# Patient Record
Sex: FEMALE | Race: BLACK OR AFRICAN AMERICAN | Employment: UNEMPLOYED | URBAN - METROPOLITAN AREA
[De-identification: names, ages, dates, MRNs, and addresses within clinical notes are randomized per-mention and may not be internally consistent; named-entity substitution may affect disease eponyms.]

---

## 2017-02-15 ENCOUNTER — OFFICE VISIT (OUTPATIENT)
Dept: FAMILY MEDICINE CLINIC | Age: 37
End: 2017-02-15

## 2017-02-15 ENCOUNTER — TELEPHONE (OUTPATIENT)
Dept: FAMILY MEDICINE CLINIC | Age: 37
End: 2017-02-15

## 2017-02-15 VITALS
SYSTOLIC BLOOD PRESSURE: 135 MMHG | TEMPERATURE: 97.7 F | RESPIRATION RATE: 12 BRPM | BODY MASS INDEX: 45.64 KG/M2 | HEIGHT: 62 IN | DIASTOLIC BLOOD PRESSURE: 81 MMHG | WEIGHT: 248 LBS | OXYGEN SATURATION: 99 % | HEART RATE: 73 BPM

## 2017-02-15 DIAGNOSIS — K08.89 PAIN, DENTAL: Primary | ICD-10-CM

## 2017-02-15 RX ORDER — AMOXICILLIN AND CLAVULANATE POTASSIUM 875; 125 MG/1; MG/1
1 TABLET, FILM COATED ORAL EVERY 12 HOURS
Qty: 20 TAB | Refills: 0 | Status: SHIPPED | OUTPATIENT
Start: 2017-02-15 | End: 2017-02-25

## 2017-02-15 RX ORDER — CHOLECALCIFEROL (VITAMIN D3) 125 MCG
CAPSULE ORAL
COMMUNITY
End: 2017-04-03

## 2017-02-15 RX ORDER — LIDOCAINE HYDROCHLORIDE 20 MG/ML
SOLUTION OROPHARYNGEAL
Qty: 100 ML | Refills: 0 | Status: SHIPPED | OUTPATIENT
Start: 2017-02-15 | End: 2017-04-03

## 2017-02-15 NOTE — MR AVS SNAPSHOT
Visit Information Date & Time Provider Department Dept. Phone Encounter #  
 2/15/2017  1:40 PM Angi Rogers MD [address] [facility] [phone] [id number] Upcoming Health Maintenance Date Due DTaP/Tdap/Td series (1 - Tdap) 10/7/2001 PAP AKA CERVICAL CYTOLOGY 10/7/2001 INFLUENZA AGE 9 TO ADULT 8/1/2016 Allergies as of 2/15/2017  Review Complete On: 2/15/2017 By: Raf Miller No Known Allergies Current Immunizations  Reviewed on 11/20/2014 Name Date Influenza Vaccine Jake Jerez) 11/20/2014 Not reviewed this visit You Were Diagnosed With   
  
 Codes Comments Pain, dental    -  Primary ICD-10-CM: E17.17 ICD-9-CM: 525.9 Vitals BP Pulse Temp Resp Height(growth percentile) Weight(growth percentile) 135/81 (BP 1 Location: Right arm, BP Patient Position: Sitting) 73 97.7 °F (36.5 °C) 12 5' 2\" (1.575 m) 248 lb (112.5 kg) LMP SpO2 BMI OB Status Smoking Status 01/15/2017 99% 45.36 kg/m2 Having regular periods Never Smoker BMI and BSA Data Body Mass Index Body Surface Area  
 45.36 kg/m 2 2.22 m 2 Preferred Pharmacy Pharmacy Name Phone [facility], [address] [phone] Your Updated Medication List  
  
   
This list is accurate as of: 2/15/17  2:23 PM.  Always use your most recent med list.  
  
  
  
  
 ALEVE 220 mg Cap Generic drug:  naproxen sodium Take  by mouth. amoxicillin-clavulanate 875-125 mg per tablet Commonly known as:  AUGMENTIN Take 1 Tab by mouth every twelve (12) hours for 10 days. lidocaine 2 % solution Commonly known as:  XYLOCAINE Coat 10 cotton balls with 30 mL for tooth pain Prescriptions Sent to Pharmacy Refills  
 lidocaine (XYLOCAINE) 2 % solution 0  Sig: Coat 10 cotton balls with 30 mL for tooth pain  
 Class: Normal  
 Pharmacy: [facility], [address] [facility] Ph #: [phone]  
 amoxicillin-clavulanate (AUGMENTIN) 875-125 mg per tablet 0 Sig: Take 1 Tab by mouth every twelve (12) hours for 10 days. Class: Normal  
 Pharmacy: [facility] [address] [facility] Ph #: [phone] Route: Oral  
  
Patient Instructions Tooth and Gum Pain: Care Instructions Your Care Instructions The most common causes of dental pain are tooth decay and gum disease. Pain can also be caused by an infection of the tooth (abscess) or the gums. Or you may have pain from a broken or cracked tooth. Other causes of pain include infection and damage to a tooth from nervous grinding of your teeth. A wisdom tooth can be painful when it is coming in but cannot break through the gum. It can also be painful when the tooth is only partway in and extra gum tissue has formed around it. The tissue can get inflamed (pericoronitis), and sometimes it gets infected. Prompt dental care can help find the cause of your toothache and keep the tooth from dying or gum disease from getting worse. Self-care at home may reduce your pain and discomfort. Follow-up care is a key part of your treatment and safety. Be sure to make and go to all appointments, and call your dentist or doctor if you are having problems. It's also a good idea to know your test results and keep a list of the medicines you take. How can you care for yourself at home? · To reduce pain and facial swelling, put an ice or cold pack on the outside of your cheek for 10 to 20 minutes at a time. Put a thin cloth between the ice and your skin. Do not use heat. · If your doctor prescribed antibiotics, take them as directed. Do not stop taking them just because you feel better. You need to take the full course of antibiotics.  
· Ask your doctor if you can take an over-the-counter pain medicine, such as acetaminophen (Tylenol), ibuprofen (Advil, Motrin), or naproxen (Aleve). Be safe with medicines. Read and follow all instructions on the label. · Avoid very hot, cold, or sweet foods and drinks if they increase your pain. · Rinse your mouth with warm salt water every 2 hours to help relieve pain and swelling. Mix 1 teaspoon of salt in 8 ounces of water. · Talk to your dentist about using special toothpaste for sensitive teeth. To reduce pain on contact with heat or cold or when brushing, brush with this toothpaste regularly or rub a small amount of the paste on the sensitive area with a clean finger 2 or 3 times a day. Floss gently between your teeth. · Do not smoke or use spit tobacco. Tobacco use can make gum problems worse, decreases your ability to fight infection in your gums, and delays healing. If you need help quitting, talk to your doctor about stop-smoking programs and medicines. These can increase your chances of quitting for good. When should you call for help? Call your dentist or doctor now or seek immediate medical care if: 
· You have signs of infection, such as: 
¨ Increased pain, swelling, warmth, or redness. ¨ Red streaks on the gum leading from a tooth. ¨ Pus draining from the gum around a tooth. ¨ A fever. Watch closely for changes in your health, and be sure to contact your doctor if: 
· You do not get better as expected. Where can you learn more? Go to http://alejandra-magali.info/. Enter 0363 [id number] in the search box to learn more about \"Tooth and Gum Pain: Care Instructions. \" Current as of: August 9, 2016 Content Version: 11.1 © 5587-8482 [employer]. Care instructions adapted under license by [employer]xIQ (which disclaims liability or warranty for this information). If you have questions about a medical condition or this instruction, always ask your healthcare professional. [place] [address] any warranty or liability for your use of this information. Introducing [facility] & HEALTH SERVICES! Mica Mann introduces LeadPages patient portal. Now you can access parts of your medical record, email your doctor's office, and request medication refills online. 1. In your internet browser, go to [email or web address] 2. Click on the First Time User? Click Here link in the Sign In box. You will see the New Member Sign Up page. 3. Enter your LeadPages Access Code exactly as it appears below. You will not need to use this code after youve completed the sign-up process. If you do not sign up before the expiration date, you must request a new code. · LeadPages Access Code: [email or web address] Expires: 5/16/2017  2:23 PM 
 
4. Enter the last four digits of your Social Security Number (xxxx) and Date of Birth (mm/dd/yyyy) as indicated and click Submit. You will be taken to the next sign-up page. 5. Create a LeadPages ID. This will be your LeadPages login ID and cannot be changed, so think of one that is secure and easy to remember. 6. Create a LeadPages password. You can change your password at any time. 7. Enter your Password Reset Question and Answer. This can be used at a later time if you forget your password. 8. Enter your e-mail address. You will receive e-mail notification when new information is available in [address] 9. Click Sign Up. You can now view and download portions of your medical record. 10. Click the Download Summary menu link to download a portable copy of your medical information. If you have questions, please visit the Frequently Asked Questions section of the LeadPages website. Remember, LeadPages is NOT to be used for urgent needs. For medical emergencies, dial 911. Now available from your iPhone and Android! Please provide this summary of care documentation to your next provider. Your primary care clinician is listed as Bear Jung. If you have any questions after today's visit, please call [phone].

## 2017-02-15 NOTE — PROGRESS NOTES
HPI  Richi Fink is a 39 y.o. female who is with tooth pain. Started yesterday and got worse today. Can get in to see a dentist until next week. It is her lower left anterior molar. Has never had a problem with this tooth before. Had a similar problem with her upper right canine a few years ago. Has not tried anything for the symptoms    PMHx:  Past Medical History   Diagnosis Date    Thyroid disease        Meds:   Current Outpatient Prescriptions   Medication Sig Dispense Refill    naproxen sodium (ALEVE) 220 mg cap Take  by mouth.  lidocaine (XYLOCAINE) 2 % solution Coat 10 cotton balls with 30 mL for tooth pain 100 mL 0    amoxicillin-clavulanate (AUGMENTIN) 875-125 mg per tablet Take 1 Tab by mouth every twelve (12) hours for 10 days. 20 Tab 0       Allergies:   No Known Allergies    Smoker:  History   Smoking Status    Never Smoker   Smokeless Tobacco    Never Used       ETOH:   History   Alcohol Use    Yes     Comment: social       FH:   Family History   Problem Relation Age of Onset    Hypertension Mother     Cancer Mother        ROS:  As listed in HPI. In addition:  Constitutional:   No headache, fever, fatigue, weight loss or weight gain      Cardiac:    No chest pain      Resp:   No cough or shortness of breath      Neuro   No loss of consciousness, dizziness, seizures      Physical Exam:  Blood pressure 135/81, pulse 73, temperature 97.7 °F (36.5 °C), resp. rate 12, height 5' 2\" (1.575 m), weight 248 lb (112.5 kg), last menstrual period 01/15/2017, SpO2 99 %. GEN: No apparent distress. Alert and oriented and responds to all questions appropriately. OROPHYARYNX: Somewhat poor dentition. General erosion of some of her teeth. The affected tooth which is the left lower anterior molar appears normal to the naked eye. There is no swelling of the gum, inflammation or apparent abscess. The gum is tender to palpation in the tooth itself is tender with pressure.   Pain generator appears to be the gum       Assessment and Plan     Dental pain, no apparent abscess. Antibiotics as a matter of course for what could be a brewing infection. NSAIDs. Local pain relief with viscous lidocaine applied to cottonball. Gave her a recipe to mix 30 mL viscous lidocaine with 10 cotton balls and use half a cottonball at a time for local anesthesia      ICD-10-CM ICD-9-CM    1. Pain, dental K08.89 525.9 naproxen sodium (ALEVE) 220 mg cap      lidocaine (XYLOCAINE) 2 % solution      amoxicillin-clavulanate (AUGMENTIN) 875-125 mg per tablet       AVS given.  Pt expressed understanding of instructions

## 2017-02-15 NOTE — PATIENT INSTRUCTIONS
Tooth and Gum Pain: Care Instructions  Your Care Instructions    The most common causes of dental pain are tooth decay and gum disease. Pain can also be caused by an infection of the tooth (abscess) or the gums. Or you may have pain from a broken or cracked tooth. Other causes of pain include infection and damage to a tooth from nervous grinding of your teeth. A wisdom tooth can be painful when it is coming in but cannot break through the gum. It can also be painful when the tooth is only partway in and extra gum tissue has formed around it. The tissue can get inflamed (pericoronitis), and sometimes it gets infected. Prompt dental care can help find the cause of your toothache and keep the tooth from dying or gum disease from getting worse. Self-care at home may reduce your pain and discomfort. Follow-up care is a key part of your treatment and safety. Be sure to make and go to all appointments, and call your dentist or doctor if you are having problems. It's also a good idea to know your test results and keep a list of the medicines you take. How can you care for yourself at home? · To reduce pain and facial swelling, put an ice or cold pack on the outside of your cheek for 10 to 20 minutes at a time. Put a thin cloth between the ice and your skin. Do not use heat. · If your doctor prescribed antibiotics, take them as directed. Do not stop taking them just because you feel better. You need to take the full course of antibiotics. · Ask your doctor if you can take an over-the-counter pain medicine, such as acetaminophen (Tylenol), ibuprofen (Advil, Motrin), or naproxen (Aleve). Be safe with medicines. Read and follow all instructions on the label. · Avoid very hot, cold, or sweet foods and drinks if they increase your pain. · Rinse your mouth with warm salt water every 2 hours to help relieve pain and swelling. Mix 1 teaspoon of salt in 8 ounces of water.   · Talk to your dentist about using special toothpaste for sensitive teeth. To reduce pain on contact with heat or cold or when brushing, brush with this toothpaste regularly or rub a small amount of the paste on the sensitive area with a clean finger 2 or 3 times a day. Floss gently between your teeth. · Do not smoke or use spit tobacco. Tobacco use can make gum problems worse, decreases your ability to fight infection in your gums, and delays healing. If you need help quitting, talk to your doctor about stop-smoking programs and medicines. These can increase your chances of quitting for good. When should you call for help? Call your dentist or doctor now or seek immediate medical care if:  · You have signs of infection, such as:  ¨ Increased pain, swelling, warmth, or redness. ¨ Red streaks on the gum leading from a tooth. ¨ Pus draining from the gum around a tooth. ¨ A fever. Watch closely for changes in your health, and be sure to contact your doctor if:  · You do not get better as expected. Where can you learn more? Go to http://alejandra-magali.info/. Enter [id number] [id number] in the search box to learn more about \"Tooth and Gum Pain: Care Instructions. \"  Current as of: August 9, 2016  Content Version: 11.1  © 2851-4663 [employer]. Care instructions adapted under license by Entomo (which disclaims liability or warranty for this information). If you have questions about a medical condition or this instruction, always ask your healthcare professional. [place] [address] any warranty or liability for your use of this information.   Below

## 2017-04-03 ENCOUNTER — OFFICE VISIT (OUTPATIENT)
Dept: FAMILY MEDICINE CLINIC | Age: 37
End: 2017-04-03

## 2017-04-03 VITALS
HEIGHT: 62 IN | DIASTOLIC BLOOD PRESSURE: 78 MMHG | RESPIRATION RATE: 16 BRPM | BODY MASS INDEX: 46.19 KG/M2 | HEART RATE: 93 BPM | WEIGHT: 251 LBS | OXYGEN SATURATION: 98 % | TEMPERATURE: 98.2 F | SYSTOLIC BLOOD PRESSURE: 106 MMHG

## 2017-04-03 DIAGNOSIS — K59.00 CONSTIPATION, UNSPECIFIED CONSTIPATION TYPE: Primary | ICD-10-CM

## 2017-04-03 RX ORDER — LORATADINE 10 MG/1
10 TABLET ORAL
COMMUNITY
End: 2018-07-12

## 2017-04-03 RX ORDER — BENZOCAINE .13; .15; .5; 2 G/100G; G/100G; G/100G; G/100G
2 GEL ORAL AS NEEDED
COMMUNITY

## 2017-04-03 NOTE — PROGRESS NOTES
.  Chief Complaint   Patient presents with    Hand Problem     blue color after ball hand in a fist    LOW BACK PAIN   . Michael Darling

## 2017-04-03 NOTE — PROGRESS NOTES
HPI  Nevin Velarde is a 39 y.o. female who has been feeling a little under the weather since last Thursdayabout 4 days ago. Having intermittent low back discomfort with no apparent mechanical cause. Had some cold chills 2 of the last 4 days. Is concerned that her hands seem like there were turning blue if she made a fist and squeeze all the blood out of them. Admits that she was pretty cold last night. Tells me that her bowel movements have normally been once every 2 days but have started spacing to once every 45 days here recently in the last few weeks. PMHx:  Past Medical History:   Diagnosis Date    Thyroid disease        Meds:   Current Outpatient Prescriptions   Medication Sig Dispense Refill    loratadine (CLARITIN) 10 mg tablet Take 10 mg by mouth.  budesonide (RHINOCORT AQUA) 32 mcg/actuation nasal spray          Allergies:   No Known Allergies    Smoker:  History   Smoking Status    Never Smoker   Smokeless Tobacco    Never Used       ETOH:   History   Alcohol Use    Yes     Comment: social       FH:   Family History   Problem Relation Age of Onset    Hypertension Mother     Cancer Mother        ROS:  As listed in HPI. In addition:  Constitutional:   No headache, fever, fatigue, weight loss or weight gain      Cardiac:    No chest pain      Resp:   No cough or shortness of breath      Neuro   No loss of consciousness, dizziness, seizures      Physical Exam:  Blood pressure 106/78, pulse 93, temperature 98.2 °F (36.8 °C), resp. rate 16, height 5' 2\" (1.575 m), weight 251 lb (113.9 kg), SpO2 98 %. GEN: No apparent distress. Alert and oriented and responds to all questions appropriately. EAR: External ears are normal.  Tympanic membranes are clear and without effusion. NOSE: Turbinates are within normal limits. No drainage  OROPHYARYNX: No oral lesions or exudates.   NECK:  Supple; no masses; thyroid normal           LUNGS: Respirations unlabored; clear to auscultation bilaterally  CARDIOVASCULAR: Regular rate and rhythm without murmurs   ABDOMEN: Soft; mildly tender in the left lower quadrant, left upper quadrant, epigastrium in the colonic distribution NEUROLOGIC:  No focal neurologic deficits. Strength and sensation grossly intact. Coordination and gait grossly intact. EXT: Well perfused. No edema. SKIN: No obvious rashes. Assessment and Plan     Vague and nonspecific symptoms that can be attributed to constipation. Predominantly worried well but does appear to be constipated. Try some basic constipation solution such as Senokot daily for at least the next week, Metamucil once or twice daily for the next month. Dulcolax suppository as needed if you have not had a bowel movement in 34 days. If anything more specific comes up return to discuss the new symptoms      ICD-10-CM ICD-9-CM    1. Constipation, unspecified constipation type K59.00 564.00        AVS given.  Pt expressed understanding of instructions

## 2018-01-17 ENCOUNTER — TELEPHONE (OUTPATIENT)
Dept: FAMILY MEDICINE CLINIC | Age: 38
End: 2018-01-17

## 2018-01-17 RX ORDER — AMOXICILLIN AND CLAVULANATE POTASSIUM 875; 125 MG/1; MG/1
1 TABLET, FILM COATED ORAL EVERY 12 HOURS
Qty: 20 TAB | Refills: 0 | Status: SHIPPED | OUTPATIENT
Start: 2018-01-17 | End: 2018-01-27

## 2018-01-17 NOTE — TELEPHONE ENCOUNTER
Patient says she needs an antibiotic for mouth/tooth pain. . She can't get to dentist until Friday.  She can be reached at  [phone]

## 2018-02-19 ENCOUNTER — OFFICE VISIT (OUTPATIENT)
Dept: FAMILY MEDICINE CLINIC | Age: 38
End: 2018-02-19

## 2018-02-19 VITALS
OXYGEN SATURATION: 99 % | RESPIRATION RATE: 16 BRPM | HEART RATE: 60 BPM | SYSTOLIC BLOOD PRESSURE: 140 MMHG | DIASTOLIC BLOOD PRESSURE: 80 MMHG | WEIGHT: 244 LBS | HEIGHT: 62 IN | BODY MASS INDEX: 44.9 KG/M2 | TEMPERATURE: 97.9 F

## 2018-02-19 DIAGNOSIS — R10.11 RIGHT UPPER QUADRANT PAIN: Primary | ICD-10-CM

## 2018-02-19 NOTE — MR AVS SNAPSHOT
[place] [address] [address] [place] [address] Galdino [address] [facility] 
[phone] Patient: Siena Mayo MRN:  [id number] Visit Information Date & Time Provider Department Dept. Phone Encounter #  
 2/19/2018  3:00 PM Maulik Chaparro, [address] [facility] 75 [phone] [id number] Upcoming Health Maintenance Date Due DTaP/Tdap/Td series (1 - Tdap) 10/7/2001 PAP AKA CERVICAL CYTOLOGY 10/7/2001 Influenza Age 5 to Adult 8/1/2017 Allergies as of 2/19/2018  Review Complete On: 2/19/2018 By: Amy Fontenot A Rash, LPN No Known Allergies Current Immunizations  Reviewed on 11/20/2014 Name Date Influenza Vaccine Pixtono Cross) 11/20/2014 Not reviewed this visit You Were Diagnosed With   
  
 Codes Comments Right upper quadrant pain    -  Primary ICD-10-CM: R10.11 ICD-9-CM: 789.01 Vitals BP Pulse Temp Resp Height(growth percentile) Weight(growth percentile) 140/80 (BP 1 Location: Right arm, BP Patient Position: Sitting) 60 97.9 °F (36.6 °C) (Oral) 16 5' 2\" (1.575 m) 244 lb (110.7 kg) LMP SpO2 BMI OB Status Smoking Status 02/05/2018 99% 44.63 kg/m2 Having regular periods Never Smoker Vitals History BMI and BSA Data Body Mass Index Body Surface Area  
 44.63 kg/m 2 2.2 m 2 Preferred Pharmacy Pharmacy Name Phone [facility]/PHARMACY #[phone] [phone] Your Updated Medication List  
  
   
This list is accurate as of: 2/19/18  3:59 PM.  Always use your most recent med list.  
  
  
  
  
 CLARITIN 10 mg tablet Generic drug:  loratadine Take 10 mg by mouth. RHINOCORT AQUA 32 mcg/actuation nasal spray Generic drug:  budesonide We Performed the Following AMYLASE [id number] CPT(R)] CBC WITH AUTOMATED DIFF [69325 CPT(R)] LIPASE [id number] CPT(R)] METABOLIC PANEL, COMPREHENSIVE [97696 CPT(R)] To-Do List   
 02/19/2018 Imaging:  US ABD LTD Patient Instructions   
benefiber daily and Colace stool softener for intermittent constipation. Low-Fat Diet for Gallbladder Disease: Care Instructions Your Care Instructions When you eat, the gallbladder releases bile, which helps you digest the fat in food. If you have an inflamed gallbladder, this may cause pain. A low-fat diet may give your gallbladder a rest so you can start to heal. Your doctor and dietitian can help you make an eating plan that does not irritate your digestive system. Always talk with your doctor or dietitian before you make changes in your diet. Follow-up care is a key part of your treatment and safety. Be sure to make and go to all appointments, and call your doctor if you are having problems. It's also a good idea to know your test results and keep a list of the medicines you take. How can you care for yourself at home? · Eat many small meals and snacks each day instead of three large meals. · Choose lean meats. ¨ Eat no more than 5 to 6½ ounces of meat a day. ¨ Cut off all fat you can see. ¨ Eat chicken and turkey without the skin. ¨ Many types of fish, such as salmon, lake trout, tuna, and herring, provide healthy omega-3 fat. But, avoid fish canned in oil, such as sardines in olive oil. ¨ Bake, broil, or grill meats, poultry, or fish instead of frying them in butter or fat. · Drink or eat nonfat or low-fat milk, yogurt, cheese, or other milk products each day. ¨ Read the labels on cheeses, and choose those with less than 5 grams of fat an ounce. ¨ Try fat-free sour cream, cream cheese, or yogurt. ¨ Avoid cream soups and cream sauces on pasta. ¨ Eat low-fat ice cream, frozen yogurt, or sorbet. Avoid regular ice cream. 
· Eat whole-grain cereals, breads, crackers, rice, or pasta.  Avoid high-fat foods such as croissants, scones, biscuits, waffles, doughnuts, muffins, granola, and high-fat breads. · Flavor your foods with herbs and spices (such as basil, tarragon, or mint), fat-free sauces, or lemon juice instead of butter. You can also use butter substitutes, fat-free mayonnaise, or fat-free dressing. · Try applesauce, prune puree, or mashed bananas to replace some or all of the fat when you bake. · Limit fats and oils, such as butter, margarine, mayonnaise, and salad dressing, to no more than 1 tablespoon a meal. 
· Avoid high-fat foods, such as: 
¨ Chocolate, whole milk, ice cream, and processed cheese. ¨ Fried or buttered foods. ¨ Sausage, salami, and fontaine. ¨ Cinnamon rolls, cakes, pies, cookies, and other pastries. ¨ Prepared snack foods, such as potato chips, nut and granola bars, and mixed nuts. ¨ Coconut and avocado. · Learn how to read food labels for serving sizes and ingredients. Fast-food and convenience-food meals often have lots of fat. Where can you learn more? Go to http://alejandra-magali.info/. Enter I619 in the search box to learn more about \"Low-Fat Diet for Gallbladder Disease: Care Instructions. \" Current as of: May 12, 2017 Content Version: 11.4 © 2771-7143 Aardvark. Care instructions adapted under license by [employer] (which disclaims liability or warranty for this information). If you have questions about a medical condition or this instruction, always ask your healthcare professional. [place] [address] any warranty or liability for your use of this information. Introducing [facility] & HEALTH SERVICES! Rony Gage introduces [employer] patient portal. Now you can access parts of your medical record, email your doctor's office, and request medication refills online. 1. In your internet browser, go to [email or web address] 2. Click on the First Time User? Click Here link in the Sign In box. You will see the New Member Sign Up page. 3. Enter your Webupo Access Code exactly as it appears below. You will not need to use this code after youve completed the sign-up process. If you do not sign up before the expiration date, you must request a new code. · Webupo Access Code: [email or web address] Expires: 5/20/2018  3:59 PM 
 
4. Enter the last four digits of your Social Security Number (xxxx) and Date of Birth (mm/dd/yyyy) as indicated and click Submit. You will be taken to the next sign-up page. 5. Create a Webupo ID. This will be your Webupo login ID and cannot be changed, so think of one that is secure and easy to remember. 6. Create a Webupo password. You can change your password at any time. 7. Enter your Password Reset Question and Answer. This can be used at a later time if you forget your password. 8. Enter your e-mail address. You will receive e-mail notification when new information is available in [address] 9. Click Sign Up. You can now view and download portions of your medical record. 10. Click the Download Summary menu link to download a portable copy of your medical information. If you have questions, please visit the Frequently Asked Questions section of the Webupo website. Remember, Webupo is NOT to be used for urgent needs. For medical emergencies, dial 911. Now available from your iPhone and Android! Please provide this summary of care documentation to your next provider. Your primary care clinician is listed as Keshia Simpson. If you have any questions after today's visit, please call [phone].

## 2018-02-19 NOTE — PROGRESS NOTES
Joy Hernández is a 40 y.o. female  Chief Complaint   Patient presents with    Abdominal Pain     1. Have you been to an emergency room, urgent clinic, or hospitalized since your last visit? YES  If yes, where when, and reason for visit?     11/2017 [address] [place] [address] ED for abdominal pain - dx with gall stones    2. Have seen or consulted any other health care provider since your last visit? NO  Please include any pap smears or colon screening in this section  If yes, where when, and reason for visit?

## 2018-02-19 NOTE — PROGRESS NOTES
Subjective:     Chief Complaint   Patient presents with    Abdominal Pain     pt seen at [facility] ED 11/2017 and diagnosed with gall stones. States increase in discomfort for about 1 month. Mainly in the evening and 2-3 times per week. HPI:  Uday Fernandez is a 40 y.o. female here today with complaints of abdominal pain. She says she was seen at [facility] ER around November 2017 for complaints of abdominal pain. She says she had blood work and [address] done and told her that [address] showed gallstones and that she would need to see PCP to get referral to surgery when she was ready to have gallbladder removed. The patient has had symptoms of right upper quadrant pain and pain radiating to the back, she describes the pain as \"like someone punched me over and over\". Worse after eating or taking NSAIDs. Frequency of pain has increased (couple times per week), episodes lasts hours at times. Says that pain only occurs at night but she usually eats late. She says sometimes stretching out makes the pain better and sometimes she has to sit hunched over to make it feel better. Denies blood in stool or urine. Has occasional constipation that she relieves by taking laxatives. Denies any vomiting. Only has occasional nausea. The patient does not know have a family history of gallbladder disease. She does not drink alcohol. No hospital, ER or specialist visits since last primary care visit except as noted above. Past Medical History:   Diagnosis Date    Thyroid disease        Social History   Substance Use Topics    Smoking status: Never Smoker    Smokeless tobacco: Never Used    Alcohol use Yes      Comment: social       Outpatient Prescriptions Marked as Taking for the 2/19/18 encounter (Office Visit) with Bienvenido Hemphill NP   Medication Sig Dispense Refill    loratadine (CLARITIN) 10 mg tablet Take 10 mg by mouth.       budesonide (RHINOCORT AQUA) 32 mcg/actuation nasal spray          No Known Allergies    Health Maintenance reviewed       ROS:  Gen: no fatigue, no fever, no chills, no unexplained weight loss or weight gain  Eyes: no excessive tearing, itching, or discharge  Nose: no rhinorrhea, no sinus pain  Mouth: no oral lesions, no sore throat, no difficulty swallowing  Resp: no shortness of breath, no wheezing, no cough  CV: no chest pain, no orthopnea, no paroxysmal nocturnal dyspnea, no lower extremity edema, no palpitations  Neuro: no headaches, no syncope or presyncopal episodes  Endo: no polyuria, no polydipsia. : no hematuria, no dysuria, no frequency, no incontinence  Heme: no lymphadenopathy, no easy bruising or bleeding, no night sweats  MSK: no joint pain or swelling        PE:  Visit Vitals    /80 (BP 1 Location: Right arm, BP Patient Position: Sitting)    Pulse 60    Temp 97.9 °F (36.6 °C) (Oral)    Resp 16    Ht 5' 2\" (1.575 m)    Wt 244 lb (110.7 kg)    LMP 02/05/2018    SpO2 99%    BMI 44.63 kg/m2     Gen: alert, oriented, no acute distress  Head: normocephalic, atraumatic  Ears: external auditory canals clear, TMs without erythema or effusion  Eyes: pupils equal round reactive to light, sclera clear, conjunctiva clear  Oral: moist mucus membranes, no oral lesions, no pharyngeal inflammation or exudate  Neck: symmetric normal sized thyroid, no carotid bruits, no jugular vein distention  Resp: no increase work of breathing, lungs clear to ausculation bilaterally, no wheezing, rales or rhonchi  CV: S1, S2 normal.  No murmurs, rubs, or gallops. Abd: soft, not tender, not distended. No hepatosplenomegaly. Normal bowel sounds. No hernias. No abdominal or renal bruits. Not currently having any GI symptoms  Neuro: cranial nerves intact, normal strength and movement in all extremities, reflexes and sensation intact and symmetric.   Skin: no lesion or rash  Extremities: no cyanosis or edema      Assessment/Plan:  Differential diagnosis and treatment options reviewed with patient who is in agreement with treatment plan as outlined below. ICD-10-CM ICD-9-CM    1. Right upper quadrant pain R10.11 789.01 CBC WITH AUTOMATED DIFF      METABOLIC PANEL, COMPREHENSIVE      AMYLASE      LIPASE      US [email or web address]     Faxed request to [facility] for labs and [address] (not showing up in Care Everywhere). Repeat labs today. Will order new US as the other US was done several months ago. If + for gallstones, will set up referral with surgery. Meanwhile, gallbladder diet discussed and handout given in AVS.    Benefiber daily with stool softener for intermittent constipation. Increase water intake. OTC Pepcid BID with meals. PRN maalox for indigestion complaints. Discussed BMI and healthy weight. Encouraged patient to work to implement changes including diet high in raw fruits and vegetables, lean protein and good fats. Limit refined, processed carbohydrates and sugar. Encouraged regular exercise. Recommended regular cardiovascular exercise 3-6 times per week for 30-60 minutes daily. I have discussed the diagnosis with the patient and the intended plan as seen in the above orders. The patient has received an after-visit summary and questions were answered concerning future plans. I have discussed medication side effects and warnings with the patient as well. The patient verbalizes understanding and agreement with the plan.

## 2018-02-19 NOTE — PATIENT INSTRUCTIONS
benefiber daily and Colace stool softener for intermittent constipation. Low-Fat Diet for Gallbladder Disease: Care Instructions  Your Care Instructions    When you eat, the gallbladder releases bile, which helps you digest the fat in food. If you have an inflamed gallbladder, this may cause pain. A low-fat diet may give your gallbladder a rest so you can start to heal. Your doctor and dietitian can help you make an eating plan that does not irritate your digestive system. Always talk with your doctor or dietitian before you make changes in your diet. Follow-up care is a key part of your treatment and safety. Be sure to make and go to all appointments, and call your doctor if you are having problems. It's also a good idea to know your test results and keep a list of the medicines you take. How can you care for yourself at home? · Eat many small meals and snacks each day instead of three large meals. · Choose lean meats. ¨ Eat no more than 5 to 6½ ounces of meat a day. ¨ Cut off all fat you can see. ¨ Eat chicken and turkey without the skin. ¨ Many types of fish, such as salmon, lake trout, tuna, and herring, provide healthy omega-3 fat. But, avoid fish canned in oil, such as sardines in olive oil. ¨ Bake, broil, or grill meats, poultry, or fish instead of frying them in butter or fat. · Drink or eat nonfat or low-fat milk, yogurt, cheese, or other milk products each day. ¨ Read the labels on cheeses, and choose those with less than 5 grams of fat an ounce. ¨ Try fat-free sour cream, cream cheese, or yogurt. ¨ Avoid cream soups and cream sauces on pasta. ¨ Eat low-fat ice cream, frozen yogurt, or sorbet. Avoid regular ice cream.  · Eat whole-grain cereals, breads, crackers, rice, or pasta. Avoid high-fat foods such as croissants, scones, biscuits, waffles, doughnuts, muffins, granola, and high-fat breads.   · Flavor your foods with herbs and spices (such as basil, tarragon, or mint), fat-free sauces, or lemon juice instead of butter. You can also use butter substitutes, fat-free mayonnaise, or fat-free dressing. · Try applesauce, prune puree, or mashed bananas to replace some or all of the fat when you bake. · Limit fats and oils, such as butter, margarine, mayonnaise, and salad dressing, to no more than 1 tablespoon a meal.  · Avoid high-fat foods, such as:  ¨ Chocolate, whole milk, ice cream, and processed cheese. ¨ Fried or buttered foods. ¨ Sausage, salami, and fontaine. ¨ Cinnamon rolls, cakes, pies, cookies, and other pastries. ¨ Prepared snack foods, such as potato chips, nut and granola bars, and mixed nuts. ¨ Coconut and avocado. · Learn how to read food labels for serving sizes and ingredients. Fast-food and convenience-food meals often have lots of fat. Where can you learn more? Go to http://alejandra-magali.info/. Enter S765 in the search box to learn more about \"Low-Fat Diet for Gallbladder Disease: Care Instructions. \"  Current as of: May 12, 2017  Content Version: 11.4  © 1241-5239 Healthwise, Incorporated. Care instructions adapted under license by [employer] (which disclaims liability or warranty for this information). If you have questions about a medical condition or this instruction, always ask your healthcare professional. [place] [address] any warranty or liability for your use of this information.

## 2018-02-20 LAB
ALBUMIN SERPL-MCNC: 4 G/DL (ref 3.5–5.5)
ALBUMIN/GLOB SERPL: 1.1 {RATIO} (ref 1.2–2.2)
ALP SERPL-CCNC: 98 IU/L (ref 39–117)
ALT SERPL-CCNC: 11 IU/L (ref 0–32)
AMYLASE SERPL-CCNC: 64 U/L (ref 31–124)
AST SERPL-CCNC: 13 IU/L (ref 0–40)
BASOPHILS # BLD AUTO: 0 X10E3/UL (ref 0–0.2)
BASOPHILS NFR BLD AUTO: 0 %
BILIRUB SERPL-MCNC: 0.4 MG/DL (ref 0–1.2)
BUN SERPL-MCNC: 9 MG/DL (ref 6–20)
BUN/CREAT SERPL: 12 (ref 9–23)
CALCIUM SERPL-MCNC: 9.4 MG/DL (ref 8.7–10.2)
CHLORIDE SERPL-SCNC: 103 MMOL/L (ref 96–106)
CO2 SERPL-SCNC: 24 MMOL/L (ref 18–29)
CREAT SERPL-MCNC: 0.74 MG/DL (ref 0.57–1)
EOSINOPHIL # BLD AUTO: 0.1 X10E3/UL (ref 0–0.4)
EOSINOPHIL NFR BLD AUTO: 2 %
ERYTHROCYTE [DISTWIDTH] IN BLOOD BY AUTOMATED COUNT: 17.5 % (ref 12.3–15.4)
GFR SERPLBLD CREATININE-BSD FMLA CKD-EPI: 104 ML/MIN/1.73
GFR SERPLBLD CREATININE-BSD FMLA CKD-EPI: 120 ML/MIN/1.73
GLOBULIN SER CALC-MCNC: 3.6 G/DL (ref 1.5–4.5)
GLUCOSE SERPL-MCNC: 98 MG/DL (ref 65–99)
HCT VFR BLD AUTO: 33.1 % (ref 34–46.6)
HGB BLD-MCNC: 11.2 G/DL (ref 11.1–15.9)
IMM GRANULOCYTES # BLD: 0 X10E3/UL (ref 0–0.1)
IMM GRANULOCYTES NFR BLD: 0 %
LIPASE SERPL-CCNC: 19 U/L (ref 14–72)
LYMPHOCYTES # BLD AUTO: 2.1 X10E3/UL (ref 0.7–3.1)
LYMPHOCYTES NFR BLD AUTO: 22 %
MCH RBC QN AUTO: 23.3 PG (ref 26.6–33)
MCHC RBC AUTO-ENTMCNC: 33.8 G/DL (ref 31.5–35.7)
MCV RBC AUTO: 69 FL (ref 79–97)
MONOCYTES # BLD AUTO: 0.8 X10E3/UL (ref 0.1–0.9)
MONOCYTES NFR BLD AUTO: 8 %
NEUTROPHILS # BLD AUTO: 6.5 X10E3/UL (ref 1.4–7)
NEUTROPHILS NFR BLD AUTO: 68 %
PLATELET # BLD AUTO: 298 X10E3/UL (ref 150–379)
POTASSIUM SERPL-SCNC: 5.2 MMOL/L (ref 3.5–5.2)
PROT SERPL-MCNC: 7.6 G/DL (ref 6–8.5)
RBC # BLD AUTO: 4.8 X10E6/UL (ref 3.77–5.28)
SODIUM SERPL-SCNC: 141 MMOL/L (ref 134–144)
WBC # BLD AUTO: 9.6 X10E3/UL (ref 3.4–10.8)

## 2018-02-22 ENCOUNTER — TELEPHONE (OUTPATIENT)
Dept: FAMILY MEDICINE CLINIC | Age: 38
End: 2018-02-22

## 2018-03-16 ENCOUNTER — OFFICE VISIT (OUTPATIENT)
Dept: FAMILY MEDICINE CLINIC | Age: 38
End: 2018-03-16

## 2018-03-16 VITALS
DIASTOLIC BLOOD PRESSURE: 83 MMHG | BODY MASS INDEX: 44.9 KG/M2 | RESPIRATION RATE: 12 BRPM | HEART RATE: 98 BPM | OXYGEN SATURATION: 98 % | TEMPERATURE: 98 F | SYSTOLIC BLOOD PRESSURE: 114 MMHG | WEIGHT: 244 LBS | HEIGHT: 62 IN

## 2018-03-16 DIAGNOSIS — R10.11 RUQ PAIN: ICD-10-CM

## 2018-03-16 DIAGNOSIS — K80.20 GALLBLADDER COLIC: Primary | ICD-10-CM

## 2018-03-16 NOTE — MR AVS SNAPSHOT
[facility] 
 
 
 [address] 
[phone] Patient: Gyu Sutton MRN:  [id number] Visit Information Date & Time Provider Department Dept. Phone Encounter #  
 3/16/2018  3:00 PM Caro Bull MD [address] [facility] [phone] [id number] Upcoming Health Maintenance Date Due DTaP/Tdap/Td series (1 - Tdap) 10/7/2001 PAP AKA CERVICAL CYTOLOGY 10/7/2001 Influenza Age 5 to Adult 8/1/2017 Allergies as of 3/16/2018  Review Complete On: 3/16/2018 By: Caro Bull MD  
 No Known Allergies Current Immunizations  Reviewed on 11/20/2014 Name Date Influenza Vaccine Robbi Susan) 11/20/2014 Not reviewed this visit You Were Diagnosed With   
  
 Codes Comments Gallbladder colic    -  Primary OHQ-23-PO: H49.30 ICD-9-CM: 574.20   
 RUQ pain     ICD-10-CM: R10.11 ICD-9-CM: 789.01 Vitals BP Pulse Temp Resp Height(growth percentile) Weight(growth percentile) 114/83 (BP 1 Location: Left arm, BP Patient Position: Sitting) 98 98 °F (36.7 °C) 12 5' 2\" (1.575 m) 244 lb (110.7 kg) SpO2 BMI OB Status Smoking Status 98% 44.63 kg/m2 Having regular periods Never Smoker BMI and BSA Data Body Mass Index Body Surface Area  
 44.63 kg/m 2 2.2 m 2 Preferred Pharmacy Pharmacy Name Phone [id number] [phone] Your Updated Medication List  
  
   
This list is accurate as of 3/16/18  3:39 PM.  Always use your most recent med list.  
  
  
  
  
 CLARITIN 10 mg tablet Generic drug:  loratadine Take 10 mg by mouth. RHINOCORT AQUA 32 mcg/actuation nasal spray Generic drug:  budesonide We Performed the Following REFERRAL TO GENERAL SURGERY [REF27 Custom] Referral Information Referral ID Referred By Referred To [id number] [address], [facility], [address] [place] [address] [address] Phone: [phone] Fax: [phone] Visits Status Start Date End Date 1 New Request 3/16/18 3/16/19 If your referral has a status of pending review or denied, additional information will be sent to support the outcome of this decision. Patient Instructions Call [phone] to schedule your ultrasound. Introducing [facility]! Dominique Serrano introduces [employer] patient portal. Now you can access parts of your medical record, email your doctor's office, and request medication refills online. 1. In your internet browser, go to [email or web address] 2. Click on the First Time User? Click Here link in the Sign In box. You will see the New Member Sign Up page. 3. Enter your [employer] Access Code exactly as it appears below. You will not need to use this code after youve completed the sign-up process. If you do not sign up before the expiration date, you must request a new code. · [employer] Access Code: [email or web address] Expires: 5/20/2018  4:59 PM 
 
4. Enter the last four digits of your Social Security Number (xxxx) and Date of Birth (mm/dd/yyyy) as indicated and click Submit. You will be taken to the next sign-up page. 5. Create a [employer] ID. This will be your [employer] login ID and cannot be changed, so think of one that is secure and easy to remember. 6. Create a [employer] password. You can change your password at any time. 7. Enter your Password Reset Question and Answer. This can be used at a later time if you forget your password. 8. Enter your e-mail address. You will receive e-mail notification when new information is available in [address] 9. Click Sign Up. You can now view and download portions of your medical record. 10. Click the Download Summary menu link to download a portable copy of your medical information. If you have questions, please visit the Frequently Asked Questions section of the [employer] website. Remember, eInstruction by [employer] is NOT to be used for urgent needs. For medical emergencies, dial 911. Now available from your iPhone and Android! Please provide this summary of care documentation to your next provider. Your primary care clinician is listed as David Marquez. If you have any questions after today's visit, please call [phone].

## 2018-03-16 NOTE — PROGRESS NOTES
HPI  Stan Calloway is a 40 y.o. female who presents with intermittent abdominal pain in the right upper quadrant. She was evaluated at [facility] emergency room a few months ago and found to have gallstones on ultrasound. Followed up with us a month ago and was complaining of right upper quadrant pain and had ultrasound reordered. Has not been referred to see a surgeon yet. Went to the [facility] emergency room last week with similar symptoms. No repeat imaging was done. Prescribed Bentyl see if this helps. It does not    Her pain pattern is happening about 3 times over the week. Usually happens a few hours after dinner. Resembles gas pain. Is relieved to a certain extent when she passes gas but always has a nagging irritation in the right upper quadrant. PMHx:  Past Medical History:   Diagnosis Date    Thyroid disease        Meds:   Current Outpatient Prescriptions   Medication Sig Dispense Refill    loratadine (CLARITIN) 10 mg tablet Take 10 mg by mouth.  budesonide (RHINOCORT AQUA) 32 mcg/actuation nasal spray          Allergies:   No Known Allergies    Smoker:  History   Smoking Status    Never Smoker   Smokeless Tobacco    Never Used       ETOH:   History   Alcohol Use    Yes     Comment: social       FH:   Family History   Problem Relation Age of Onset    Hypertension Mother     Cancer Mother        ROS:   As listed in HPI. In addition:  Constitutional:   No headache, fever, fatigue, weight loss or weight gain      Cardiac:    No chest pain      Resp:   No cough or shortness of breath      Neuro   No loss of consciousness, dizziness, seizures      Physical Exam:  Blood pressure 114/83, pulse 98, temperature 98 °F (36.7 °C), resp. rate 12, height 5' 2\" (1.575 m), weight 244 lb (110.7 kg), SpO2 98 %. GEN: No apparent distress. Alert and oriented and responds to all questions appropriately. NEUROLOGIC:  No focal neurologic deficits. Strength and sensation grossly intact.   Coordination and gait grossly intact. EXT: Well perfused. No edema. SKIN: No obvious rashes. Abdomen soft nontender nondistended, normoactive bowel sounds. Mild right upper quadrant tenderness with no Wright sign       Assessment and Plan     Gallbladder colic, right upper quadrant pain  Has an ultrasound ordered, would be reasonable to go through with this. At this point need surgical evaluation, does have legs symptoms it could be gallbladder colic 3 days out of the week and they are lasting longer and longer. Not a perfect historian. Has other confounding factors such as gas and constipation but these have their own distinct symptoms that she can relate      ICD-10-CM ICD-9-CM    1. Gallbladder colic U05.04 475.61    2. RUQ pain R10.11 789.01        AVS given.  Pt expressed understanding of instructions

## 2018-03-19 ENCOUNTER — HOSPITAL ENCOUNTER (OUTPATIENT)
Dept: ULTRASOUND IMAGING | Age: 38
Discharge: HOME | End: 2018-03-19
Attending: NURSE PRACTITIONER
Payer: MEDICAID

## 2018-03-19 DIAGNOSIS — R10.11 RIGHT UPPER QUADRANT PAIN: ICD-10-CM

## 2018-03-19 PROCEDURE — 76705 ECHO EXAM OF ABDOMEN: CPT

## 2018-03-20 ENCOUNTER — TELEPHONE (OUTPATIENT)
Dept: FAMILY MEDICINE CLINIC | Age: 38
End: 2018-03-20

## 2018-03-20 ENCOUNTER — OFFICE VISIT (OUTPATIENT)
Dept: SURGERY | Age: 38
End: 2018-03-20

## 2018-03-20 VITALS
SYSTOLIC BLOOD PRESSURE: 147 MMHG | HEIGHT: 62 IN | OXYGEN SATURATION: 98 % | WEIGHT: 234.4 LBS | DIASTOLIC BLOOD PRESSURE: 84 MMHG | HEART RATE: 75 BPM | BODY MASS INDEX: 43.13 KG/M2 | RESPIRATION RATE: 18 BRPM | TEMPERATURE: 98 F

## 2018-03-20 DIAGNOSIS — K80.20 GALLSTONES: Primary | ICD-10-CM

## 2018-03-20 NOTE — MR AVS SNAPSHOT
[address], [address] [facility], [address] [place] [address] [facility] 
[phone] Patient: Catalina Khan MRN:  [id number] Visit Information Date & Time Provider Department Dept. Phone Encounter #  
 3/20/2018  9:40 AM Thu Reeves MD [facility] [place] [phone] [id number] Your Appointments 2018  2:40 PM  
POST OP with Thu Reeves MD  
[facility] Dr. Fernando Araya [facility]) Appt Note: po- lap eduardo 3/26/18  
 [address] [facility], [address] [facility], [address] [address]  
[address] [facility], [address] [place] [address] [address] [address] Upcoming Health Maintenance Date Due DTaP/Tdap/Td series (1 - Tdap) 10/7/2001 PAP AKA CERVICAL CYTOLOGY 10/7/2001 Influenza Age 5 to Adult 2017 Allergies as of 3/20/2018  Review Complete On: 3/20/2018 By: Thu Reeves MD  
 No Known Allergies Current Immunizations  Reviewed on 2014 Name Date Influenza Vaccine Kassandra Lindsey) 2014 Not reviewed this visit You Were Diagnosed With   
  
 Codes Comments Gallstones    -  Primary ICD-10-CM: N46.97 ICD-9-CM: 574.20 Vitals BP Pulse Temp Resp Height(growth percentile) Weight(growth percentile) 147/84 (BP 1 Location: Left arm, BP Patient Position: Sitting) 75 98 °F (36.7 °C) 18 5' 2\" (1.575 m) 234 lb 6.4 oz (106.3 kg) SpO2 BMI OB Status Smoking Status 98% 42.87 kg/m2 Having regular periods Never Smoker BMI and BSA Data Body Mass Index Body Surface Area  
 42.87 kg/m 2 2.16 m 2 Preferred Pharmacy Pharmacy Name Phone [phone] [phone] Your Updated Medication List  
  
   
This list is accurate as of 3/20/18 11:04 AM.  Always use your most recent med list.  
  
  
  
  
 CLARITIN 10 mg tablet Generic drug:  loratadine Take 10 mg by mouth. RHINOCORT AQUA 32 mcg/actuation nasal spray Generic drug:  budesonide Introducing hospitals & HEALTH SERVICES! [facility] introduces [employer] patient portal. Now you can access parts of your medical record, email your doctor's office, and request medication refills online. 1. In your internet browser, go to [email or web address] 2. Click on the First Time User? Click Here link in the Sign In box. You will see the New Member Sign Up page. 3. Enter your [employer] Access Code exactly as it appears below. You will not need to use this code after youve completed the sign-up process. If you do not sign up before the expiration date, you must request a new code. · [employer] Access Code: [email or web address] Expires: 5/20/2018  4:59 PM 
 
4. Enter the last four digits of your Social Security Number (xxxx) and Date of Birth (mm/dd/yyyy) as indicated and click Submit. You will be taken to the next sign-up page. 5. Create a [employer] ID. This will be your [employer] login ID and cannot be changed, so think of one that is secure and easy to remember. 6. Create a [employer] password. You can change your password at any time. 7. Enter your Password Reset Question and Answer. This can be used at a later time if you forget your password. 8. Enter your e-mail address. You will receive e-mail notification when new information is available in [address] 9. Click Sign Up. You can now view and download portions of your medical record. 10. Click the Download Summary menu link to download a portable copy of your medical information. If you have questions, please visit the Frequently Asked Questions section of the [employer] website. Remember, [employer] is NOT to be used for urgent needs. For medical emergencies, dial 911. Now available from your iPhone and Android! Please provide this summary of care documentation to your next provider. Your primary care clinician is listed as Kahlil Joy. If you have any questions after today's visit, please call [phone].

## 2018-03-20 NOTE — PROGRESS NOTES
+cholecystitis. Needs to be seen by surgery. I see that she was seen by Dr Cathy Matute already today. Did they schedule surgery?

## 2018-03-20 NOTE — TELEPHONE ENCOUNTER
Radiology calling to confirm that we had results from [address] of abdomen done yesterday. Pt needs to be scheduled for follow up.

## 2018-03-20 NOTE — PROGRESS NOTES
Surgery Consult:  gallstones  Requesting physician:  Dr. Danya Franco:   Patient 40 y.o.  female presents with intermittent dull epigastric pain radiating to the back with nausea and vomiting for years. Patient also complains of bloating and bouts of diarrhea and constipation. No history of jaundice or pancreatitis. No F/C/S. Pain usually occurs after eating fatty meals and usually after 30 minutes to couples hours post-prandial.  US from 3/19/18 showed cholecystolithiasis with associated gallbladder tenderness. LFTs from 3/6/18 were normal.      Past Medical & Surgical History:  Past Medical History:   Diagnosis Date    Thyroid disease       Past Surgical History:   Procedure Laterality Date    HX GYN      BTL   [facility]             Social History:  Social History     Social History    Marital status: SINGLE     Spouse name: N/A    Number of children: N/A    Years of education: N/A     Occupational History    Not on file. Social History Main Topics    Smoking status: Never Smoker    Smokeless tobacco: Never Used    Alcohol use Yes      Comment: social    Drug use: No    Sexual activity: Yes     Partners: Male     Birth control/ protection: Surgical     Other Topics Concern    Not on file     Social History Narrative        Family History:  Family History   Problem Relation Age of Onset    Hypertension Mother     Cancer Mother         Medications:  Current Outpatient Prescriptions   Medication Sig    loratadine (CLARITIN) 10 mg tablet Take 10 mg by mouth.  budesonide (RHINOCORT AQUA) 32 mcg/actuation nasal spray      No current facility-administered medications for this visit. Allergies:  No Known Allergies    Review of Systems  A comprehensive review of systems was negative except for that written in the HPI.     Objective:     Exam:    Visit Vitals    /84 (BP 1 Location: Left arm, BP Patient Position: Sitting)    Pulse 75    Temp 98 °F (36.7 °C)    Resp 18    Ht 5' 2\" (1.575 m)    Wt 106.3 kg (234 lb 6.4 oz)    SpO2 98%    BMI 42.87 kg/m2     General appearance: alert, cooperative, no distress, appears stated age  Eyes: no sclera icterus  Lungs: clear to auscultation bilaterally  Heart: regular rate and rhythm  Abdomen: soft, non-distended. Mild epigastric and RUQ tenderness. No rebound or guarding. Extremities: extremities normal, atraumatic, no cyanosis or edema. JULIANN. Skin: Skin color, texture, turgor normal. No rashes or lesions. No jaundice. Neurologic: Grossly normal    Assessment:     Gallstones  Morbid obesity (BMI 43)    Plan:     Laparoscopic cholecystectomy/IOC. Risks, benefit, and alternative to surgery was discussed with the patient. The risks of surgery include but not limited to infection, bleeding, intraabdominal organ injury, bile duct injury, retained stone, bile leak, possible conversion to open, and the risks of general anesthetic. Patient is agreeable to surgery. All questions answered.

## 2018-03-20 NOTE — PROGRESS NOTES
Called pt and verified name and . Informed pt of results. Pt verbalized understanding of results. Pt did confirm that she saw Dr. Radha Sorensen today and is currently scheduled for surgery . No questions from pt at this time.

## 2018-03-21 NOTE — PERIOP NOTES
[facility]  Preoperative Instructions        Surgery Date 3/26/18          Time of Arrival 0700    1. On the day of your surgery, please report to the Surgical Services Registration Desk and sign in at your designated time. The Surgery Center is located to the right of the Emergency Room. 2. You must have someone with you to drive you home. You should not drive a car for 24 hours following surgery. Please make arrangements for a friend or family member to stay with you for the first 24 hours after your surgery. 3. Do not have anything to eat or drink (including water, gum, mints, coffee, juice) after midnight 3/25/18?? Davi Jews ? This may not apply to medications prescribed by your physician. ?(Please note below the special instructions with medications to take the morning of your procedure.)    4. We recommend you do not drink any alcoholic beverages for 24 hours before and after your surgery. 5. Contact your surgeons office for instructions on the following medications: non-steroidal anti-inflammatory drugs (i.e. Advil, Aleve), vitamins, and supplements. (Some surgeons will want you to stop these medications prior to surgery and others may allow you to take them)  **If you are currently taking Plavix, Coumadin, Aspirin and/or other blood-thinning agents, contact your surgeon for instructions. ** Your surgeon will partner with the physician prescribing these medications to determine if it is safe to stop or if you need to continue taking. Please do not stop taking these medications without instructions from your surgeon    6. Wear comfortable clothes. Wear glasses instead of contacts. Do not bring any money or jewelry. Please bring picture ID, insurance card, and any prearranged co-payment or hospital payment. Do not wear make-up, particularly mascara the morning of your surgery. Do not wear nail polish, particularly if you are having foot /hand surgery.   Wear your hair loose or down, no ponytails, buns, salvatore pins or clips. All body piercings must be removed. Please shower with antibacterial soap for three consecutive days before and on the morning of surgery, but do not apply any lotions, powders or deodorants after the shower on the day of surgery. Please use a fresh towels after each shower. Please sleep in clean clothes and change bed linens the night before surgery. Please do not shave for 48 hours prior to surgery. Shaving of the face is acceptable. 7. You should understand that if you do not follow these instructions your surgery may be cancelled. If your physical condition changes (I.e. fever, cold or flu) please contact your surgeon as soon as possible. 8. It is important that you be on time. If a situation occurs where you may be late, please call [phone] (OR Holding Area). 9. If you have any questions and or problems, please call [phone] (Pre-admission Testing). 10. Your surgery time may be subject to change. You will receive a phone call the evening prior if your time changes. 11.  If having outpatient surgery, you must have someone to drive you here, stay with you during the duration of your stay, and to drive you home at time of discharge. 12.   In an effort to improve the efficiency, privacy, and safety for all of our Pre-op patients visitors are not allowed in the Holding area. Once you arrive and are registered your family/visitors will be asked to remain in the waiting room. The Pre-op staff will get you from the Surgical Waiting Area and will explain to you and your family/visitors that the Pre-op phase is beginning. The staff will answer any questions and provide instructions for tracking of the patient, by use of the existing tracking number and color-coded status board in the waiting room.   At this time the staff will also ask for your designated spokesperson information in the event that the physician or staff need to provide an update or obtain any pertinent information. The designated spokesperson will be notified if the physician needs to speak to family during the pre-operative phase. If at any time your family/visitors has questions or concerns they may approach the volunteer desk in the waiting area for assistance. Special Instructions:none    MEDICATIONS TO TAKE THE MORNING OF SURGERY WITH A SIP OF WATER:may take allergy meds if needed. I understand a pre-operative phone call will be made to verify my surgery time. In the event that I am not available, I give permission for a message to be left on my answering service and/or with another person?   {yes @ [phone]         ___________________      __________   _________    (Signature of Patient)             (Witness)                (Date and Time)

## 2018-03-26 ENCOUNTER — ANESTHESIA (OUTPATIENT)
Dept: SURGERY | Age: 38
End: 2018-03-26
Payer: MEDICAID

## 2018-03-26 ENCOUNTER — HOSPITAL ENCOUNTER (OUTPATIENT)
Age: 38
Setting detail: OUTPATIENT SURGERY
Discharge: HOME | End: 2018-03-26
Attending: SURGERY | Admitting: SURGERY
Payer: MEDICAID

## 2018-03-26 ENCOUNTER — ANESTHESIA EVENT (OUTPATIENT)
Dept: SURGERY | Age: 38
End: 2018-03-26
Payer: MEDICAID

## 2018-03-26 ENCOUNTER — APPOINTMENT (OUTPATIENT)
Dept: GENERAL RADIOLOGY | Age: 38
End: 2018-03-26
Attending: SURGERY
Payer: MEDICAID

## 2018-03-26 VITALS
RESPIRATION RATE: 14 BRPM | SYSTOLIC BLOOD PRESSURE: 140 MMHG | DIASTOLIC BLOOD PRESSURE: 70 MMHG | OXYGEN SATURATION: 97 % | HEIGHT: 63 IN | BODY MASS INDEX: 42.38 KG/M2 | HEART RATE: 61 BPM | TEMPERATURE: 97.6 F | WEIGHT: 239.2 LBS

## 2018-03-26 DIAGNOSIS — K80.20 GALLSTONES: Primary | ICD-10-CM

## 2018-03-26 LAB — HCG UR QL: NEGATIVE

## 2018-03-26 PROCEDURE — 77030008684 HC TU ET CUF COVD -B: Performed by: ANESTHESIOLOGY

## 2018-03-26 PROCEDURE — 76210000021 HC REC RM PH II 0.5 TO 1 HR: Performed by: SURGERY

## 2018-03-26 PROCEDURE — 77030035048 HC TRCR ENDOSC OPTCL COVD -B: Performed by: SURGERY

## 2018-03-26 PROCEDURE — 74011000250 HC RX REV CODE- 250: Performed by: SURGERY

## 2018-03-26 PROCEDURE — C1758 CATHETER, URETERAL: HCPCS | Performed by: SURGERY

## 2018-03-26 PROCEDURE — 74011250636 HC RX REV CODE- 250/636

## 2018-03-26 PROCEDURE — 77030011640 HC PAD GRND REM COVD -A: Performed by: SURGERY

## 2018-03-26 PROCEDURE — 77030035051: Performed by: SURGERY

## 2018-03-26 PROCEDURE — 77030002895 HC DEV VASC CLOSR COVD -B: Performed by: SURGERY

## 2018-03-26 PROCEDURE — 77030020263 HC SOL INJ SOD CL0.9% LFCR 1000ML: Performed by: SURGERY

## 2018-03-26 PROCEDURE — 76010000149 HC OR TIME 1 TO 1.5 HR: Performed by: SURGERY

## 2018-03-26 PROCEDURE — 77030008756 HC TU IRR SUC STRY -B: Performed by: SURGERY

## 2018-03-26 PROCEDURE — 77030037892: Performed by: SURGERY

## 2018-03-26 PROCEDURE — 74011000250 HC RX REV CODE- 250

## 2018-03-26 PROCEDURE — 74011250636 HC RX REV CODE- 250/636: Performed by: SURGERY

## 2018-03-26 PROCEDURE — 76060000033 HC ANESTHESIA 1 TO 1.5 HR: Performed by: SURGERY

## 2018-03-26 PROCEDURE — 77030012022 HC APPL CLP ENDOSC COVD -C: Performed by: SURGERY

## 2018-03-26 PROCEDURE — 77030020256 HC SOL INJ NACL 0.9%  500ML: Performed by: SURGERY

## 2018-03-26 PROCEDURE — 74011636320 HC RX REV CODE- 636/320: Performed by: SURGERY

## 2018-03-26 PROCEDURE — 77030008771 HC TU NG SALEM SUMP -A: Performed by: ANESTHESIOLOGY

## 2018-03-26 PROCEDURE — 74011636320 HC RX REV CODE- 636/320

## 2018-03-26 PROCEDURE — 74300 X-RAY BILE DUCTS/PANCREAS: CPT

## 2018-03-26 PROCEDURE — 88304 TISSUE EXAM BY PATHOLOGIST: CPT | Performed by: SURGERY

## 2018-03-26 PROCEDURE — 77030035045 HC TRCR ENDOSC VRSPRT BLDLSS COVD -B: Performed by: SURGERY

## 2018-03-26 PROCEDURE — 76210000006 HC OR PH I REC 0.5 TO 1 HR: Performed by: SURGERY

## 2018-03-26 PROCEDURE — 77030020782 HC GWN BAIR PAWS FLX 3M -B

## 2018-03-26 PROCEDURE — 77030020053 HC ELECTRD LAPSCP COVD -B: Performed by: SURGERY

## 2018-03-26 PROCEDURE — 77030032490 HC SLV COMPR SCD KNE COVD -B: Performed by: SURGERY

## 2018-03-26 PROCEDURE — 77030026438 HC STYL ET INTUB CARD -A: Performed by: ANESTHESIOLOGY

## 2018-03-26 PROCEDURE — 81025 URINE PREGNANCY TEST: CPT

## 2018-03-26 PROCEDURE — 77030002933 HC SUT MCRYL J&J -A: Performed by: SURGERY

## 2018-03-26 PROCEDURE — 74011250636 HC RX REV CODE- 250/636: Performed by: ANESTHESIOLOGY

## 2018-03-26 PROCEDURE — 77030031139 HC SUT VCRL2 J&J -A: Performed by: SURGERY

## 2018-03-26 PROCEDURE — 77030010507 HC ADH SKN DERMBND J&J -B: Performed by: SURGERY

## 2018-03-26 RX ORDER — ONDANSETRON 4 MG/1
4 TABLET, FILM COATED ORAL
Qty: 20 TAB | Refills: 0 | Status: SHIPPED | OUTPATIENT
Start: 2018-03-26 | End: 2018-07-12

## 2018-03-26 RX ORDER — HYDROMORPHONE HYDROCHLORIDE 1 MG/ML
0.2 INJECTION, SOLUTION INTRAMUSCULAR; INTRAVENOUS; SUBCUTANEOUS
Status: DISCONTINUED | OUTPATIENT
Start: 2018-03-26 | End: 2018-03-26 | Stop reason: HOSPADM

## 2018-03-26 RX ORDER — PROPOFOL 10 MG/ML
INJECTION, EMULSION INTRAVENOUS AS NEEDED
Status: DISCONTINUED | OUTPATIENT
Start: 2018-03-26 | End: 2018-03-26 | Stop reason: HOSPADM

## 2018-03-26 RX ORDER — GLYCOPYRROLATE 0.2 MG/ML
INJECTION INTRAMUSCULAR; INTRAVENOUS AS NEEDED
Status: DISCONTINUED | OUTPATIENT
Start: 2018-03-26 | End: 2018-03-26 | Stop reason: HOSPADM

## 2018-03-26 RX ORDER — SODIUM CHLORIDE, SODIUM LACTATE, POTASSIUM CHLORIDE, CALCIUM CHLORIDE 600; 310; 30; 20 MG/100ML; MG/100ML; MG/100ML; MG/100ML
25 INJECTION, SOLUTION INTRAVENOUS CONTINUOUS
Status: DISCONTINUED | OUTPATIENT
Start: 2018-03-26 | End: 2018-03-26 | Stop reason: HOSPADM

## 2018-03-26 RX ORDER — CEFAZOLIN SODIUM/WATER 2 G/20 ML
2 SYRINGE (ML) INTRAVENOUS
Status: COMPLETED | OUTPATIENT
Start: 2018-03-26 | End: 2018-03-26

## 2018-03-26 RX ORDER — HYDROMORPHONE HYDROCHLORIDE 2 MG/ML
INJECTION, SOLUTION INTRAMUSCULAR; INTRAVENOUS; SUBCUTANEOUS AS NEEDED
Status: DISCONTINUED | OUTPATIENT
Start: 2018-03-26 | End: 2018-03-26 | Stop reason: HOSPADM

## 2018-03-26 RX ORDER — LIDOCAINE HYDROCHLORIDE 20 MG/ML
INJECTION, SOLUTION EPIDURAL; INFILTRATION; INTRACAUDAL; PERINEURAL AS NEEDED
Status: DISCONTINUED | OUTPATIENT
Start: 2018-03-26 | End: 2018-03-26 | Stop reason: HOSPADM

## 2018-03-26 RX ORDER — OXYCODONE AND ACETAMINOPHEN 5; 325 MG/1; MG/1
1-2 TABLET ORAL
Qty: 50 TAB | Refills: 0 | Status: SHIPPED | OUTPATIENT
Start: 2018-03-26 | End: 2018-07-12

## 2018-03-26 RX ORDER — FENTANYL CITRATE 50 UG/ML
INJECTION, SOLUTION INTRAMUSCULAR; INTRAVENOUS AS NEEDED
Status: DISCONTINUED | OUTPATIENT
Start: 2018-03-26 | End: 2018-03-26 | Stop reason: HOSPADM

## 2018-03-26 RX ORDER — MIDAZOLAM HYDROCHLORIDE 1 MG/ML
INJECTION, SOLUTION INTRAMUSCULAR; INTRAVENOUS AS NEEDED
Status: DISCONTINUED | OUTPATIENT
Start: 2018-03-26 | End: 2018-03-26 | Stop reason: HOSPADM

## 2018-03-26 RX ORDER — SODIUM CHLORIDE 0.9 % (FLUSH) 0.9 %
5-10 SYRINGE (ML) INJECTION EVERY 8 HOURS
Status: DISCONTINUED | OUTPATIENT
Start: 2018-03-26 | End: 2018-03-26 | Stop reason: HOSPADM

## 2018-03-26 RX ORDER — ROCURONIUM BROMIDE 10 MG/ML
INJECTION, SOLUTION INTRAVENOUS AS NEEDED
Status: DISCONTINUED | OUTPATIENT
Start: 2018-03-26 | End: 2018-03-26 | Stop reason: HOSPADM

## 2018-03-26 RX ORDER — ACETAMINOPHEN 10 MG/ML
INJECTION, SOLUTION INTRAVENOUS AS NEEDED
Status: DISCONTINUED | OUTPATIENT
Start: 2018-03-26 | End: 2018-03-26 | Stop reason: HOSPADM

## 2018-03-26 RX ORDER — SODIUM CHLORIDE 0.9 % (FLUSH) 0.9 %
5-10 SYRINGE (ML) INJECTION AS NEEDED
Status: DISCONTINUED | OUTPATIENT
Start: 2018-03-26 | End: 2018-03-26 | Stop reason: HOSPADM

## 2018-03-26 RX ORDER — FENTANYL CITRATE 50 UG/ML
25 INJECTION, SOLUTION INTRAMUSCULAR; INTRAVENOUS
Status: DISCONTINUED | OUTPATIENT
Start: 2018-03-26 | End: 2018-03-26 | Stop reason: HOSPADM

## 2018-03-26 RX ORDER — LIDOCAINE HYDROCHLORIDE 10 MG/ML
0.1 INJECTION, SOLUTION EPIDURAL; INFILTRATION; INTRACAUDAL; PERINEURAL AS NEEDED
Status: DISCONTINUED | OUTPATIENT
Start: 2018-03-26 | End: 2018-03-26 | Stop reason: HOSPADM

## 2018-03-26 RX ORDER — DIPHENHYDRAMINE HYDROCHLORIDE 50 MG/ML
12.5 INJECTION, SOLUTION INTRAMUSCULAR; INTRAVENOUS AS NEEDED
Status: DISCONTINUED | OUTPATIENT
Start: 2018-03-26 | End: 2018-03-26 | Stop reason: HOSPADM

## 2018-03-26 RX ORDER — BUPIVACAINE HYDROCHLORIDE AND EPINEPHRINE 5; 5 MG/ML; UG/ML
INJECTION, SOLUTION EPIDURAL; INTRACAUDAL; PERINEURAL AS NEEDED
Status: DISCONTINUED | OUTPATIENT
Start: 2018-03-26 | End: 2018-03-26 | Stop reason: HOSPADM

## 2018-03-26 RX ORDER — KETOROLAC TROMETHAMINE 30 MG/ML
INJECTION, SOLUTION INTRAMUSCULAR; INTRAVENOUS AS NEEDED
Status: DISCONTINUED | OUTPATIENT
Start: 2018-03-26 | End: 2018-03-26 | Stop reason: HOSPADM

## 2018-03-26 RX ORDER — NEOSTIGMINE METHYLSULFATE 1 MG/ML
INJECTION INTRAVENOUS AS NEEDED
Status: DISCONTINUED | OUTPATIENT
Start: 2018-03-26 | End: 2018-03-26 | Stop reason: HOSPADM

## 2018-03-26 RX ORDER — DEXAMETHASONE SODIUM PHOSPHATE 4 MG/ML
INJECTION, SOLUTION INTRA-ARTICULAR; INTRALESIONAL; INTRAMUSCULAR; INTRAVENOUS; SOFT TISSUE AS NEEDED
Status: DISCONTINUED | OUTPATIENT
Start: 2018-03-26 | End: 2018-03-26 | Stop reason: HOSPADM

## 2018-03-26 RX ORDER — ONDANSETRON 2 MG/ML
INJECTION INTRAMUSCULAR; INTRAVENOUS AS NEEDED
Status: DISCONTINUED | OUTPATIENT
Start: 2018-03-26 | End: 2018-03-26 | Stop reason: HOSPADM

## 2018-03-26 RX ADMIN — ONDANSETRON 4 MG: 2 INJECTION INTRAMUSCULAR; INTRAVENOUS at 09:39

## 2018-03-26 RX ADMIN — KETOROLAC TROMETHAMINE 30 MG: 30 INJECTION, SOLUTION INTRAMUSCULAR; INTRAVENOUS at 09:41

## 2018-03-26 RX ADMIN — PROPOFOL 200 MG: 10 INJECTION, EMULSION INTRAVENOUS at 08:49

## 2018-03-26 RX ADMIN — SODIUM CHLORIDE, SODIUM LACTATE, POTASSIUM CHLORIDE, AND CALCIUM CHLORIDE 25 ML/HR: 600; 310; 30; 20 INJECTION, SOLUTION INTRAVENOUS at 08:07

## 2018-03-26 RX ADMIN — NEOSTIGMINE METHYLSULFATE 3 MG: 1 INJECTION INTRAVENOUS at 09:45

## 2018-03-26 RX ADMIN — Medication 2 G: at 08:47

## 2018-03-26 RX ADMIN — MIDAZOLAM HYDROCHLORIDE 4 MG: 1 INJECTION, SOLUTION INTRAMUSCULAR; INTRAVENOUS at 08:44

## 2018-03-26 RX ADMIN — ROCURONIUM BROMIDE 30 MG: 10 INJECTION, SOLUTION INTRAVENOUS at 08:49

## 2018-03-26 RX ADMIN — FENTANYL CITRATE 100 MCG: 50 INJECTION, SOLUTION INTRAMUSCULAR; INTRAVENOUS at 08:49

## 2018-03-26 RX ADMIN — HYDROMORPHONE HYDROCHLORIDE 0.5 MG: 2 INJECTION, SOLUTION INTRAMUSCULAR; INTRAVENOUS; SUBCUTANEOUS at 09:10

## 2018-03-26 RX ADMIN — GLYCOPYRROLATE 0.5 MG: 0.2 INJECTION INTRAMUSCULAR; INTRAVENOUS at 09:38

## 2018-03-26 RX ADMIN — SODIUM CHLORIDE, POTASSIUM CHLORIDE, SODIUM LACTATE AND CALCIUM CHLORIDE: 600; 310; 30; 20 INJECTION, SOLUTION INTRAVENOUS at 09:15

## 2018-03-26 RX ADMIN — LIDOCAINE HYDROCHLORIDE 100 MG: 20 INJECTION, SOLUTION EPIDURAL; INFILTRATION; INTRACAUDAL; PERINEURAL at 08:49

## 2018-03-26 RX ADMIN — ACETAMINOPHEN 1000 MG: 10 INJECTION, SOLUTION INTRAVENOUS at 09:12

## 2018-03-26 RX ADMIN — SODIUM CHLORIDE, POTASSIUM CHLORIDE, SODIUM LACTATE AND CALCIUM CHLORIDE: 600; 310; 30; 20 INJECTION, SOLUTION INTRAVENOUS at 07:49

## 2018-03-26 RX ADMIN — DEXAMETHASONE SODIUM PHOSPHATE 8 MG: 4 INJECTION, SOLUTION INTRA-ARTICULAR; INTRALESIONAL; INTRAMUSCULAR; INTRAVENOUS; SOFT TISSUE at 09:13

## 2018-03-26 NOTE — INTERVAL H&P NOTE
H&P Update:  Taryn Birch was seen and examined. History and physical has been reviewed. The patient has been examined.  There have been no significant clinical changes since the completion of the originally dated History and Physical.    Signed By: Tricia Mckenzie MD     March 26, 2018 8:27 AM

## 2018-03-26 NOTE — ANESTHESIA POSTPROCEDURE EVALUATION
Post-Anesthesia Evaluation and Assessment    Patient: Emelyn Jack MRN: [id number]  SSN: [id number]    YOB: 1980  Age: 40 y.o. Sex: female       Cardiovascular Function/Vital Signs  Visit Vitals    /74    Pulse 61    Temp 36.8 °C (98.2 °F)    Resp 14    Ht 5' 3\" (1.6 m)    Wt 108.5 kg (239 lb 3.2 oz)    SpO2 100%    BMI 42.37 kg/m2       Patient is status post general anesthesia for Procedure(s):  LAPAROSCOPIC CHOLECYSTECTOMY WITH GRAMS . Nausea/Vomiting: None    Postoperative hydration reviewed and adequate. Pain:  Pain Scale 1: Numeric (0 - 10) (03/26/18 1020)  Pain Intensity 1: 6 (03/26/18 1020)   Managed    Neurological Status:   Neuro (WDL): Exceptions to WDL (03/26/18 1005)  Neuro  Neurologic State: Drowsy; Eyes open to voice (03/26/18 1005)  Orientation Level: Oriented to person;Oriented to situation (03/26/18 1005)  Cognition: Follows commands (03/26/18 1005)  Speech: Delayed responses; Appropriate for age (03/26/18 1005)  LUE Motor Response: Purposeful (03/26/18 1005)  LLE Motor Response: Purposeful (03/26/18 1005)  RUE Motor Response: Purposeful (03/26/18 1005)  RLE Motor Response: Purposeful (03/26/18 1005)   At baseline    Mental Status and Level of Consciousness: Arousable    Pulmonary Status:   O2 Device: Nasal cannula (03/26/18 1020)   Adequate oxygenation and airway patent    Complications related to anesthesia: None    Post-anesthesia assessment completed.  No concerns    Signed By: Eduardo Zimmerman MD     March 26, 2018

## 2018-03-26 NOTE — PERIOP NOTES
Handoff Report from Operating Room to PACU    Report received from ROMANA Hawkins RN and EDWARD Nazario regarding Miguelina Herrera. Surgeon(s):  Jose R Patton MD  And Procedure(s) (LRB):  LAPAROSCOPIC CHOLECYSTECTOMY WITH GRAMS  (N/A)  confirmed   with allergies, dressings and local anesthetic discussed. Anesthesia type, drugs, patient history, complications, estimated blood loss, vital signs, intake and output, and last pain medication, lines, reversal medications and temperature were reviewed.

## 2018-03-26 NOTE — IP AVS SNAPSHOT
[address] [facility]. 
[phone] Patient: Kathleen Alvarado MRN: [id number] DEH:[birth year] About your hospitalization You were admitted on:  March 26, 2018 You last received care in the:  [facility] PACU You were discharged on:  March 26, 2018 Why you were hospitalized Your primary diagnosis was:  Not on File Follow-up Information Follow up With Details Comments Contact Info Maxi Kyle MD   [address] [address] [facility] [address] [phone] Your Scheduled Appointments Friday April 06, 2018  2:40 PM EDT  
POST OP with Gray Higginbotham MD  
[facility] Dr. Fernando Araya [facility] ([address]) [address] [place] [address], [address] [facility]  
[phone] Discharge Orders None A check tushar indicates which time of day the medication should be taken. My Medications START taking these medications Instructions Each Dose to Equal  
 Morning Noon Evening Bedtime  
 ondansetron hcl 4 mg tablet Commonly known as:  ZOFRAN (AS HYDROCHLORIDE) Your last dose was: Your next dose is: Take 1 Tab by mouth every eight (8) hours as needed for Nausea. 4 mg  
    
   
   
   
  
 oxyCODONE-acetaminophen 5-325 mg per tablet Commonly known as:  PERCOCET Your last dose was: Your next dose is: Take 1-2 Tabs by mouth every four (4) hours as needed for Pain. Max Daily Amount: 12 Tabs. 1-2 Tab CONTINUE taking these medications Instructions Each Dose to Equal  
 Morning Noon Evening Bedtime CLARITIN 10 mg tablet Generic drug:  loratadine Your last dose was: Your next dose is: Take 10 mg by mouth. 10 mg RHINOCORT AQUA 32 mcg/actuation nasal spray Generic drug:  budesonide Your last dose was: Your next dose is:    
   
   
      
   
   
   
  
  
  
Where to Get Your Medications Information on where to get these meds will be given to you by the nurse or doctor. ! Ask your nurse or doctor about these medications  
  ondansetron hcl 4 mg tablet  
 oxyCODONE-acetaminophen 5-325 mg per tablet Discharge Instructions Patient Discharge Instructions Miguelina Herrera / [id number] : 1980 Admitted 3/26/2018 Discharged: 3/26/2018 What to do at [facility] Recommended diet: Low fat, Low cholesterol Recommended activity: no heavy lifting > 20 lbs x 2 weeks; no driving while taking narcotics for pain. May take shower, but no bath x 2 weeks. Keep ice over the incision to minimize swelling. Please take over the counter stool softener if you develop constipation. If you experience a lot of drainage, develop redness around the wound, or a fever over 101 F occurs please call the office. Narcotics and anesthesia sometimes cause nausea and vomiting. If persistent please call the office. Do not hesitate to call with questions or concerns. Follow-up with Dr. Mary Christian in 2 weeks. Please call [phone] for an appointment. Information obtained by : 
I understand that if any problems occur once I am at home I am to contact my physician. I understand and acknowledge receipt of the instructions indicated above. Physician's or R.N.'s Signature                                                                  Date/Time Patient or Representative Signature                                                          Date/Time DISCHARGE SUMMARY from Nurse PATIENT INSTRUCTIONS: 
 
After general anesthesia or intravenous sedation, for 24 hours or while taking prescription Narcotics: · Limit your activities · Do not drive and operate hazardous machinery · Do not make important personal or business decisions · Do  not drink alcoholic beverages · If you have not urinated within 8 hours after discharge, please contact your surgeon on call. Report the following to your surgeon: 
· Excessive pain, swelling, redness or odor of or around the surgical area · Temperature over 100.5 · Nausea and vomiting lasting longer than 4 hours or if unable to take medications · Any signs of decreased circulation or nerve impairment to extremity: change in color, persistent  numbness, tingling, coldness or increase pain · Any questions What to do at Home: *  Please give a list of your current medications to your Primary Care Provider. *  Please update this list whenever your medications are discontinued, doses are 
    changed, or new medications (including over-the-counter products) are added. *  Please carry medication information at all times in case of emergency situations. These are general instructions for a healthy lifestyle: No smoking/ No tobacco products/ Avoid exposure to second hand smoke Surgeon General's Warning:  Quitting smoking now greatly reduces serious risk to your health. Obesity, smoking, and sedentary lifestyle greatly increases your risk for illness A healthy diet, regular physical exercise & weight monitoring are important for maintaining a healthy lifestyle You may be retaining fluid if you have a history of heart failure or if you experience any of the following symptoms:  Weight gain of 3 pounds or more overnight or 5 pounds in a week, increased swelling in our hands or feet or shortness of breath while lying flat in bed.   Please call your doctor as soon as you notice any of these symptoms; do not wait until your next office visit. Recognize signs and symptoms of STROKE: 
 
 
? soup 
? broth ?  toast  
? crackers ? applesauce 
? bananas  
? mashed potatoes, 
? soft or scrambled eggs 
? oatmeal 
?  jello It is important to eat when taking your pain medication. This will help to prevent nausea. If possible, please try to time your meals with your medications. It is very important to stay hydrated following surgery. Sip fluids frequently while awake. Avoid acidic drinks such as citrus juices and soda for 24 hours. Carbonated beverages may cause bloating and gas. Acceptable fluids include: 
 
? water (flavor packets may add variety) ? coffee or tea (in moderation) ? Gatorade ? Marzette Bank ? apple juice 
? cranberry juice You are encouraged to cough and deep breathe every hour when awake. This will help to prevent respiratory complications following anesthesia. You may want to hug a pillow when coughing and sneezing to add additional support to the surgical area and to decrease discomfort if you had abdominal or chest surgery. If you are discharged home with support stockings, you may remove them after 24 hours. Support stockings are used to help prevent blood clots in the legs following surgery. Please take time to review all of your Home Care Instructions and Medication Information sheets provided in your discharge packet. If you have any questions, please contact your surgeons office. Thank you. How to Care for Your Wound After Its Treated With DERMABOND* Topical Skin Adhesive DERMABOND* Topical Skin Adhesive (2-octyl cyanoacrylate) is a sterile, liquid skin adhesive 
that holds wound edges together. The film will usually remain in place for 5 to 10 days, then 
naturally fall off your skin. The following will answer some of your questions and provide instructions for proper care for your 
wound while it is healing: CHECK WOUND APPEARANCE 
 Some swelling, redness, and pain are common with all wounds and normally will go away as the 
 wound heals. If swelling, redness, or pain increases or if the wound feels warm to the touch, 
contact a doctor. Also contact a doctor if the wound edges reopen or separate. REPLACE BANDAGES 
 If your wound is bandaged, keep the bandage dry.  Replace the dressing daily until the adhesive film has fallen off or if the 
bandage should become wet, unless otherwise instructed by your 
physician.  When changing the dressing, do not place tape directly over the DERMABOND adhesive film, because removing the tape later may also 
remove the film. AVOID TOPICAL MEDICATIONS  Do not apply liquid or ointment medications or any other product to your wound while the DERMABOND adhesive film is in place. These may loosen the film before your wound is healed. KEEP WOUND DRY AND PROTECTED  You may occasionally and briefly wet your wound in the shower or bath. Do not soak or scrub 
your wound, do not swim, and avoid periods of heavy perspiration until the DERMABOND 
adhesive has naturally fallen off. After showering or bathing, gently blot your wound dry with a 
soft towel. If a protective dressing is being used, apply a fresh, dry bandage, being sure to keep 
the tape off the DERMABOND adhesive film.  Apply a clean, dry bandage over the wound if necessary to protect it.  Protect your wound from injury until the skin has had sufficient time to heal. 
 Do not scratch, rub, or pick at the DERMABOND adhesive film. This may loosen the film before 
your wound is healed.  Protect the wound from prolonged exposure to sunlight or tanning lamps while the film is in 
place. If you have any questions or concerns about this product, please consult your doctor. *Trademark ©[employer] 2002 Narcotic-Analgesic/Acetaminophen (Percocet, Norco, Lorcet HD, Lortab 10/325) - (By mouth) Why this medicine is used:  
Relieves pain. Contact a nurse or doctor right away if you have: · Extreme weakness, shallow breathing, slow heartbeat · Severe confusion, lightheadedness, dizziness, fainting · Yellow skin or eyes, dark urine or pale stools · Severe constipation, severe stomach pain, nausea, vomiting, loss of appetite · Sweating or cold, clammy skin Common side effects: · Mild constipation, nausea, vomiting · Sleepiness, tiredness · Itching, rash © 2017 [facility] Information is for End User's use only and may not be sold, redistributed or otherwise used for commercial purposes. Ondansetron (Zofran, Zofran ODT, Zuplenz) - (By mouth, Into the mouth) Why this medicine is used:  
Prevents nausea and vomiting. Contact a nurse or doctor right away if you have: 
· Fast, pounding, or uneven heartbeat · Lightheadedness or fainting · Trouble breathing Common side effects: 
· Headache, tiredness · Constipation, diarrhea © 2017 [facility] Information is for End User's use only and may not be sold, redistributed or otherwise used for commercial purposes. Introducing [facility]! Doe Gray introduces [employer] patient portal. Now you can access parts of your medical record, email your doctor's office, and request medication refills online. 1. In your internet browser, go to [email or web address] 2. Click on the First Time User? Click Here link in the Sign In box. You will see the New Member Sign Up page. 3. Enter your [employer] Access Code exactly as it appears below. You will not need to use this code after youve completed the sign-up process. If you do not sign up before the expiration date, you must request a new code. · [employer] Access Code: [email or web address] Expires: 5/20/2018  4:59 PM 
 
4. Enter the last four digits of your Social Security Number (xxxx) and Date of Birth (mm/dd/yyyy) as indicated and click Submit. You will be taken to the next sign-up page. 5. Create a [employer] ID. This will be your [employer] login ID and cannot be changed, so think of one that is secure and easy to remember. 6. Create a [employer] password. You can change your password at any time. 7. Enter your Password Reset Question and Answer. This can be used at a later time if you forget your password. 8. Enter your e-mail address. You will receive e-mail notification when new information is available in [address] 9. Click Sign Up. You can now view and download portions of your medical record. 10. Click the Download Summary menu link to download a portable copy of your medical information. If you have questions, please visit the Frequently Asked Questions section of the [employer] website. Remember, [employer] is NOT to be used for urgent needs. For medical emergencies, dial 911. Now available from your iPhone and Android! Providers Seen During Your Hospitalization Provider Specialty Primary office phone Milana Balderrama MD General Surgery [phone] Your Primary Care Physician (PCP) Primary Care Physician Office Phone Office Fax Si Lisa [phone] You are allergic to the following No active allergies Recent Documentation Height Weight BMI OB Status Smoking Status 1.6 m 108.5 kg 42.37 kg/m2 Having regular periods Never Smoker Emergency Contacts Name Discharge Info Relation Home Work Mobile Fernando Drake DISCHARGE CAREGIVER [3] Boyfriend [17] [phone] Fernando Drake  Other Relative [6] [phone] Patient Belongings The following personal items are in your possession at time of discharge: 
  Dental Appliances: None  Visual Aid: None   Hearing Aids/Status: Does not own  Home Medications: None   Jewelry: None  Clothing: Undergarments, With patient, Footwear, Socks (home clothing)    Other Valuables: None  Personal Items Sent to Safe: No valuables to send to security Please provide this summary of care documentation to your next provider. Signatures-by signing, you are acknowledging that this After Visit Summary has been reviewed with you and you have received a copy. Patient Signature:  ____________________________________________________________ Date:  ____________________________________________________________  
  
Phyliss Ghee Provider Signature:  ____________________________________________________________ Date:  ____________________________________________________________

## 2018-03-26 NOTE — H&P (VIEW-ONLY)
Surgery Consult:  gallstones  Requesting physician:  Dr. Rey Public:   Patient 40 y.o.  female presents with intermittent dull epigastric pain radiating to the back with nausea and vomiting for years. Patient also complains of bloating and bouts of diarrhea and constipation. No history of jaundice or pancreatitis. No F/C/S. Pain usually occurs after eating fatty meals and usually after 30 minutes to couples hours post-prandial.  US from 3/19/18 showed cholecystolithiasis with associated gallbladder tenderness. LFTs from 3/6/18 were normal.      Past Medical & Surgical History:  Past Medical History:   Diagnosis Date    Thyroid disease       Past Surgical History:   Procedure Laterality Date    HX GYN      BTL   [de-identified] GYN             Social History:  Social History     Social History    Marital status: SINGLE     Spouse name: N/A    Number of children: N/A    Years of education: N/A     Occupational History    Not on file. Social History Main Topics    Smoking status: Never Smoker    Smokeless tobacco: Never Used    Alcohol use Yes      Comment: social    Drug use: No    Sexual activity: Yes     Partners: Male     Birth control/ protection: Surgical     Other Topics Concern    Not on file     Social History Narrative        Family History:  Family History   Problem Relation Age of Onset    Hypertension Mother     Cancer Mother         Medications:  Current Outpatient Prescriptions   Medication Sig    loratadine (CLARITIN) 10 mg tablet Take 10 mg by mouth.  budesonide (RHINOCORT AQUA) 32 mcg/actuation nasal spray      No current facility-administered medications for this visit. Allergies:  No Known Allergies    Review of Systems  A comprehensive review of systems was negative except for that written in the HPI.     Objective:     Exam:    Visit Vitals    /84 (BP 1 Location: Left arm, BP Patient Position: Sitting)    Pulse 75    Temp 98 °F (36.7 °C)    Resp 18    Ht 5' 2\" (1.575 m)    Wt 106.3 kg (234 lb 6.4 oz)    SpO2 98%    BMI 42.87 kg/m2     General appearance: alert, cooperative, no distress, appears stated age  Eyes: no sclera icterus  Lungs: clear to auscultation bilaterally  Heart: regular rate and rhythm  Abdomen: soft, non-distended. Mild epigastric and RUQ tenderness. No rebound or guarding. Extremities: extremities normal, atraumatic, no cyanosis or edema. JULIANN. Skin: Skin color, texture, turgor normal. No rashes or lesions. No jaundice. Neurologic: Grossly normal    Assessment:     Gallstones  Morbid obesity (BMI 43)    Plan:     Laparoscopic cholecystectomy/IOC. Risks, benefit, and alternative to surgery was discussed with the patient. The risks of surgery include but not limited to infection, bleeding, intraabdominal organ injury, bile duct injury, retained stone, bile leak, possible conversion to open, and the risks of general anesthetic. Patient is agreeable to surgery. All questions answered.

## 2018-03-26 NOTE — DISCHARGE INSTRUCTIONS
Patient Discharge Instructions    Crista Rebolledo / [id number] : 1980    Admitted 3/26/2018 Discharged: 3/26/2018         What to do at Home    Recommended diet: Low fat, Low cholesterol    Recommended activity: no heavy lifting > 20 lbs x 2 weeks; no driving while taking narcotics for pain. May take shower, but no bath x 2 weeks. Keep ice over the incision to minimize swelling. Please take over the counter stool softener if you develop constipation. If you experience a lot of drainage, develop redness around the wound, or a fever over 101 F occurs please call the office. Narcotics and anesthesia sometimes cause nausea and vomiting. If persistent please call the office. Do not hesitate to call with questions or concerns. Follow-up with Dr. Mary Hernandes in 2 weeks. Please call [phone] for an appointment. Information obtained by :  I understand that if any problems occur once I am at home I am to contact my physician. I understand and acknowledge receipt of the instructions indicated above.                                                                                                                                                Physician's or R.N.'s Signature                                                                  Date/Time                                                                                                                                              Patient or Representative Signature                                                          Date/Time      DISCHARGE SUMMARY from Nurse    PATIENT INSTRUCTIONS:    After general anesthesia or intravenous sedation, for 24 hours or while taking prescription Narcotics:  · Limit your activities  · Do not drive and operate hazardous machinery  · Do not make important personal or business decisions  · Do  not drink alcoholic beverages  · If you have not urinated within 8 hours after discharge, please contact your surgeon on call. Report the following to your surgeon:  · Excessive pain, swelling, redness or odor of or around the surgical area  · Temperature over 100.5  · Nausea and vomiting lasting longer than 4 hours or if unable to take medications  · Any signs of decreased circulation or nerve impairment to extremity: change in color, persistent  numbness, tingling, coldness or increase pain  · Any questions    What to do at Home:  *  Please give a list of your current medications to your Primary Care Provider. *  Please update this list whenever your medications are discontinued, doses are      changed, or new medications (including over-the-counter products) are added. *  Please carry medication information at all times in case of emergency situations. These are general instructions for a healthy lifestyle:    No smoking/ No tobacco products/ Avoid exposure to second hand smoke  Surgeon General's Warning:  Quitting smoking now greatly reduces serious risk to your health. Obesity, smoking, and sedentary lifestyle greatly increases your risk for illness    A healthy diet, regular physical exercise & weight monitoring are important for maintaining a healthy lifestyle    You may be retaining fluid if you have a history of heart failure or if you experience any of the following symptoms:  Weight gain of 3 pounds or more overnight or 5 pounds in a week, increased swelling in our hands or feet or shortness of breath while lying flat in bed. Please call your doctor as soon as you notice any of these symptoms; do not wait until your next office visit. Recognize signs and symptoms of STROKE:    F-face looks uneven    A-arms unable to move or move unevenly    S-speech slurred or non-existent    T-time-call 911 as soon as signs and symptoms begin-DO NOT go       Back to bed or wait to see if you get better-TIME IS BRAIN. Warning Signs of HEART ATTACK     Call 911 if you have these symptoms:   Chest discomfort. Most heart attacks involve discomfort in the center of the chest that lasts more than a few minutes, or that goes away and comes back. It can feel like uncomfortable pressure, squeezing, fullness, or pain.  Discomfort in other areas of the upper body. Symptoms can include pain or discomfort in one or both arms, the back, neck, jaw, or stomach.  Shortness of breath with or without chest discomfort.  Other signs may include breaking out in a cold sweat, nausea, or lightheadedness. Don't wait more than five minutes to call 911 - MINUTES MATTER! Fast action can save your life. Calling 911 is almost always the fastest way to get lifesaving treatment. Emergency Medical Services staff can begin treatment when they arrive -- up to an hour sooner than if someone gets to the hospital by car. The discharge information has been reviewed with the patient and caregiver. The patient and caregiver verbalized understanding. Discharge medications reviewed with the patient and caregiver and appropriate educational materials and side effects teaching were provided. ___________________________________________________________________________________________________________________________________    A common side effect of anesthesia following surgery is nausea and/or vomiting. In order to decrease symptoms, it is wise to avoid foods that are high in fat, greasy foods, milk products, and spicy foods for the first 24 hours. Acceptable foods for the first 24 hours following surgery include but are not limited to:     soup   broth    toast    crackers    applesauce    bananas    mashed potatoes,   soft or scrambled eggs   oatmeal    jello    It is important to eat when taking your pain medication. This will help to prevent nausea. If possible, please try to time your meals with your medications. It is very important to stay hydrated following surgery. Sip fluids frequently while awake.  Avoid acidic drinks such as citrus juices and soda for 24 hours. Carbonated beverages may cause bloating and gas. Acceptable fluids include:    - water (flavor packets may add variety)  - coffee or tea (in moderation)  - Gatorade  - Santiago-aid  - apple juice  - cranberry juice    You are encouraged to cough and deep breathe every hour when awake. This will help to prevent respiratory complications following anesthesia. You may want to hug a pillow when coughing and sneezing to add additional support to the surgical area and to decrease discomfort if you had abdominal or chest surgery. If you are discharged home with support stockings, you may remove them after 24 hours. Support stockings are used to help prevent blood clots in the legs following surgery. Please take time to review all of your Home Care Instructions and Medication Information sheets provided in your discharge packet. If you have any questions, please contact your surgeons office. Thank you. How to Care for Your Wound After Its Treated With  DERMABOND* Topical Skin Adhesive  DERMABOND* Topical Skin Adhesive (2-octyl cyanoacrylate) is a sterile, liquid skin adhesive  that holds wound edges together. The film will usually remain in place for 5 to 10 days, then  naturally fall off your skin. The following will answer some of your questions and provide instructions for proper care for your  wound while it is healing:    CHECK WOUND APPEARANCE   Some swelling, redness, and pain are common with all wounds and normally will go away as the  wound heals. If swelling, redness, or pain increases or if the wound feels warm to the touch,  contact a doctor. Also contact a doctor if the wound edges reopen or separate. REPLACE BANDAGES   If your wound is bandaged, keep the bandage dry.  Replace the dressing daily until the adhesive film has fallen off or if the  bandage should become wet, unless otherwise instructed by your  physician.    When changing the dressing, do not place tape directly over the  DERMABOND adhesive film, because removing the tape later may also  remove the film. AVOID TOPICAL MEDICATIONS   Do not apply liquid or ointment medications or any other product to your wound while the  DERMABOND adhesive film is in place. These may loosen the film before your wound is healed. KEEP WOUND DRY AND PROTECTED   You may occasionally and briefly wet your wound in the shower or bath. Do not soak or scrub  your wound, do not swim, and avoid periods of heavy perspiration until the DERMABOND  adhesive has naturally fallen off. After showering or bathing, gently blot your wound dry with a  soft towel. If a protective dressing is being used, apply a fresh, dry bandage, being sure to keep  the tape off the DERMABOND adhesive film.  Apply a clean, dry bandage over the wound if necessary to protect it.  Protect your wound from injury until the skin has had sufficient time to heal.   Do not scratch, rub, or pick at the DERMABOND adhesive film. This may loosen the film before  your wound is healed.  Protect the wound from prolonged exposure to sunlight or tanning lamps while the film is in  place. If you have any questions or concerns about this product, please consult your doctor. *Trademark ©[employer] 2002     Narcotic-Analgesic/Acetaminophen (Percocet, [facility], Lorcet HD, Lortab 10/325) - (By mouth)   Why this medicine is used:   Relieves pain.   Contact a nurse or doctor right away if you have:  · Extreme weakness, shallow breathing, slow heartbeat  · Severe confusion, lightheadedness, dizziness, fainting  · Yellow skin or eyes, dark urine or pale stools  · Severe constipation, severe stomach pain, nausea, vomiting, loss of appetite  · Sweating or cold, clammy skin     Common side effects:  · Mild constipation, nausea, vomiting  · Sleepiness, tiredness  · Itching, rash  © 2017 [address] [employer] Information is for End User's use only and may not be sold, redistributed or otherwise used for commercial purposes. Ondansetron (Zofran, Zofran ODT, Zuplenz) - (By mouth, Into the mouth)   Why this medicine is used:   Prevents nausea and vomiting. Contact a nurse or doctor right away if you have:  · Fast, pounding, or uneven heartbeat  · Lightheadedness or fainting  · Trouble breathing     Common side effects:  · Headache, tiredness  · Constipation, diarrhea  © 2017 [facility] Information is for End User's use only and may not be sold, redistributed or otherwise used for commercial purposes.

## 2018-03-26 NOTE — OP NOTES
Patient ID:   Name: Guy Sutton   Medical Record Number: [id number]   YOB: 1980    Date of Surgery: 3/26/2018      Preoperative Diagnosis:   Gallstones    Postoperative Diagnosis:  Gallstones    Procedures:  Laparoscopic cholecystectomy with intraoperative cholangiogram    Surgeon: Blaise Felty, MD      Anesthesia: General    Findings:  No filling defect within the common bile duct    Estimated Blood Loss: 10cc    Specimens:   gallbladder    Indications:  Patient 40 y.o.  female presents with intermittent dull epigastric pain radiating to the back with nausea and vomiting for years. Pain usually occurs after eating fatty meals and usually after 30 minutes to couples hours post-prandial.  US from 3/19/18 showed cholecystolithiasis with associated gallbladder tenderness. Patient presents today for cholecystectomy.     Procedure Details: The patient was seen in the Holding Room. The risks, benefits, complications, treatment options, and expected outcomes were discussed with the patient. After informed consent was performed, patient was taken to the operating room and was placed supine in the operating table. After establishing general anesthesia, abdomen was prepped and draped in standard surgical fashion. Small right subcostal incision was made and a 5 mm blunt trocar was placed under direct vision. CO2 pneumoperitoneum was then created. Additional 5 mm blunt trocar was placed infraumbilical and a 12 mm trocar was placed in the epigastric area. Abdomen was explored. Patient was found to have distended gallbladder with several large stones at the base. The base of the gallbladder was carefully dissected. Cystic artery was identified anteriorly. This was clipped x 3 and divided. Cyst duct was then identified and isolated.    A clip was applied at the base of the gallbladder and small incision was made within the cystic duct for the cholangiogram.  Cholangiogram catheter was placed and cholangiogram was performed. No filling defect was noted within the common bile duct. Cholangiogram catheter was removed and 3 clips were applied to the cystic duct on the patient side. Cystic duct was divided. Posterior branch of cystic artery was identified and isolated. This was clipped and divided. Gallbladder was then removed off of the gallbladder fossa using electrocautery. Gallbladder was retrieved using the Endocatch bag through the epigastric port site. Abdomen was irrigated. Good hemostasis was obtained. Fascial defect in the epigastric port was closed with interrupted figure of eight 0-0 Vicryl using Endoclose. Trocars were removed. CO2 pneumoperitoneum was released. Skin was then approximated using 4-0 Vicryl subcuticular stitch followed by Dermabond. Instrument, sponge, and needle counts were correct prior to closure and at the conclusion of the case. The patient was taken to recovery room in good condition having tolerated the procedure well.       CC:  Tia Matthews MD

## 2018-03-26 NOTE — ANESTHESIA PREPROCEDURE EVALUATION
Anesthetic History   No history of anesthetic complications            Review of Systems / Medical History  Patient summary reviewed, nursing notes reviewed and pertinent labs reviewed    Pulmonary  Within defined limits                 Neuro/Psych   Within defined limits           Cardiovascular  Within defined limits                Exercise tolerance: >4 METS     GI/Hepatic/Renal     GERD: well controlled          Comments: Cholelithiasis Endo/Other        Morbid obesity  Pertinent negatives: No hypothyroidism  Comments: H/O of Hyperthyroidism s/p radioiodine ablation - now with normal function Other Findings            Physical Exam    Airway  Mallampati: II  TM Distance: > 6 cm  Neck ROM: normal range of motion   Mouth opening: Normal     Cardiovascular  Regular rate and rhythm,  S1 and S2 normal,  no murmur, click, rub, or gallop             Dental  No notable dental hx       Pulmonary  Breath sounds clear to auscultation               Abdominal  GI exam deferred       Other Findings            Anesthetic Plan    ASA: 3  Anesthesia type: general          Induction: Intravenous  Anesthetic plan and risks discussed with: Patient

## 2018-03-26 NOTE — PERIOP NOTES
Pt. For discharge today. VSS,denies pain,nausea,drsgC/D/I, with derma bond. Reviewed D/C instructions, Rx., & F/U care with pt. & family. Both the pt. & caregiver  have good understanding of d/c instructions. IV d/c'd and pt. Discharged home.

## 2018-03-28 ENCOUNTER — TELEPHONE (OUTPATIENT)
Dept: FAMILY MEDICINE CLINIC | Age: 38
End: 2018-03-28

## 2018-03-28 NOTE — TELEPHONE ENCOUNTER
Instructed to take a laxative stool softer and she can try prunes or prune juice and increase water intake

## 2018-04-06 ENCOUNTER — OFFICE VISIT (OUTPATIENT)
Dept: SURGERY | Age: 38
End: 2018-04-06

## 2018-04-06 VITALS
TEMPERATURE: 98.4 F | BODY MASS INDEX: 42.24 KG/M2 | OXYGEN SATURATION: 100 % | SYSTOLIC BLOOD PRESSURE: 136 MMHG | RESPIRATION RATE: 18 BRPM | DIASTOLIC BLOOD PRESSURE: 84 MMHG | WEIGHT: 238.4 LBS | HEART RATE: 82 BPM | HEIGHT: 63 IN

## 2018-04-06 DIAGNOSIS — Z09 POSTOPERATIVE EXAMINATION: Primary | ICD-10-CM

## 2018-04-06 NOTE — PROGRESS NOTES
Patient is here for follow-up. Patient underwent lap eduardo/IOC on 3/26/18. Doing well. No complaints. Path reviewed with the patient. PE:  Visit Vitals    /84 (BP 1 Location: Left arm, BP Patient Position: Sitting)    Pulse 82    Temp 98.4 °F (36.9 °C)    Resp 18    Ht 5' 3\" (1.6 m)    Wt 108.1 kg (238 lb 6.4 oz)    LMP 03/24/2018    SpO2 100%    BMI 42.23 kg/m2     Abdomen:  [employer] C/D/I.     Assessment:  S/p lap eduardo/IOC    Plan:  -f/u prn

## 2018-04-06 NOTE — PROGRESS NOTES
1. Have you been to the ER, urgent care clinic since your last visit? Hospitalized since your last visit? No    2. Have you seen or consulted any other health care providers outside of the [address] [place] [address] since your last visit? Include any pap smears or colon screening.  No

## 2018-04-06 NOTE — MR AVS SNAPSHOT
[address], [address] [address] 
[phone] Patient: Denisha Moreno MRN:  [id number] Visit Information Date & Time Provider Department Dept. Phone Encounter #  
 4/6/2018  2:40 PM Melinda Reynolds MD [facility] 58 [id number] Upcoming Health Maintenance Date Due DTaP/Tdap/Td series (1 - Tdap) 10/7/2001 PAP AKA CERVICAL CYTOLOGY 10/7/2001 Influenza Age 5 to Adult 8/1/2017 Allergies as of 4/6/2018  Review Complete On: 4/6/2018 By: Melinda Reynolds MD  
 No Known Allergies Current Immunizations  Reviewed on 11/20/2014 Name Date Influenza Vaccine Wiliami Viraj) 11/20/2014 Not reviewed this visit You Were Diagnosed With   
  
 Codes Comments Postoperative examination    -  Primary ICD-10-CM: D67 ICD-9-CM: V67.00 Vitals BP Pulse Temp Resp Height(growth percentile) Weight(growth percentile) 136/84 (BP 1 Location: Left arm, BP Patient Position: Sitting) 82 98.4 °F (36.9 °C) 18 5' 3\" (1.6 m) 238 lb 6.4 oz (108.1 kg) LMP SpO2 BMI OB Status Smoking Status 03/24/2018 100% 42.23 kg/m2 Having regular periods Never Smoker BMI and BSA Data Body Mass Index Body Surface Area  
 42.23 kg/m 2 2.19 m 2 Preferred Pharmacy Pharmacy Name Phone [id number] [facility] [phone] Your Updated Medication List  
  
   
This list is accurate as of 4/6/18  3:35 PM.  Always use your most recent med list.  
  
  
  
  
 CLARITIN 10 mg tablet Generic drug:  loratadine Take 10 mg by mouth. ondansetron hcl 4 mg tablet Commonly known as:  ZOFRAN (AS HYDROCHLORIDE) Take 1 Tab by mouth every eight (8) hours as needed for Nausea. oxyCODONE-acetaminophen 5-325 mg per tablet Commonly known as:  PERCOCET  
 Take 1-2 Tabs by mouth every four (4) hours as needed for Pain. Max Daily Amount: 12 Tabs. RHINOCORT AQUA 32 mcg/actuation nasal spray Generic drug:  budesonide Introducing [facility] [place] [facility]! [facility] introduces [employer] patient portal. Now you can access parts of your medical record, email your doctor's office, and request medication refills online. 1. In your internet browser, go to [email or web address] 2. Click on the First Time User? Click Here link in the Sign In box. You will see the New Member Sign Up page. 3. Enter your [employer] Access Code exactly as it appears below. You will not need to use this code after youve completed the sign-up process. If you do not sign up before the expiration date, you must request a new code. · [employer] Access Code: [email or web address] Expires: 5/20/2018  4:59 PM 
 
4. Enter the last four digits of your Social Security Number (xxxx) and Date of Birth (mm/dd/yyyy) as indicated and click Submit. You will be taken to the next sign-up page. 5. Create a [employer] ID. This will be your [employer] login ID and cannot be changed, so think of one that is secure and easy to remember. 6. Create a [employer] password. You can change your password at any time. 7. Enter your Password Reset Question and Answer. This can be used at a later time if you forget your password. 8. Enter your e-mail address. You will receive e-mail notification when new information is available in [address] 9. Click Sign Up. You can now view and download portions of your medical record. 10. Click the Download Summary menu link to download a portable copy of your medical information. If you have questions, please visit the Frequently Asked Questions section of the [employer] website. Remember, [employer] is NOT to be used for urgent needs. For medical emergencies, dial 911. Now available from your iPhone and Android! Please provide this summary of care documentation to your next provider. Your primary care clinician is listed as Hitesh Stuart. If you have any questions after today's visit, please call [phone].

## 2018-04-12 ENCOUNTER — TELEPHONE (OUTPATIENT)
Dept: SURGERY | Age: 38
End: 2018-04-12

## 2018-04-12 NOTE — TELEPHONE ENCOUNTER
C/o soreness upper mid abdomen/opsite after walking 1 mile or so. Denies any redness or drainage; otherwise she is doing well post lap eduardo on 3/26/18. Instructed pt it is not uncommon to still having so discomfort. Take over the counter Ibuprofen 600 mg every q6hrs as needed for pain. Do not take on empty stomach. If symptoms no better or worsen call the office back. All questions answered with clarification.

## 2018-05-10 ENCOUNTER — TELEPHONE (OUTPATIENT)
Dept: FAMILY MEDICINE CLINIC | Age: 38
End: 2018-05-10

## 2018-05-10 NOTE — TELEPHONE ENCOUNTER
Called pt and verified name and . Informed pt that the last time our office made a phone call to her was on 3/28/18 as documented in nerissa. Informed pt that it could of been the automatic phone service calling her to remind her ofd an upcoming appointment because the call was missed 3 days ago. Pt reports that her VM was full and couldn't receive messages. Pt verbalized understanding. No questions at this time.

## 2018-07-12 ENCOUNTER — OFFICE VISIT (OUTPATIENT)
Dept: FAMILY MEDICINE CLINIC | Age: 38
End: 2018-07-12

## 2018-07-12 VITALS
WEIGHT: 242 LBS | HEART RATE: 79 BPM | DIASTOLIC BLOOD PRESSURE: 84 MMHG | HEIGHT: 63 IN | SYSTOLIC BLOOD PRESSURE: 130 MMHG | OXYGEN SATURATION: 99 % | BODY MASS INDEX: 42.88 KG/M2 | RESPIRATION RATE: 16 BRPM | TEMPERATURE: 98.2 F

## 2018-07-12 DIAGNOSIS — R30.0 DYSURIA: Primary | ICD-10-CM

## 2018-07-12 PROBLEM — E66.01 OBESITY, MORBID (HCC): Status: ACTIVE | Noted: 2018-07-12

## 2018-07-12 LAB
BILIRUB UR QL STRIP: NEGATIVE
GLUCOSE UR-MCNC: NEGATIVE MG/DL
KETONES P FAST UR STRIP-MCNC: NEGATIVE MG/DL
PH UR STRIP: 6 [PH] (ref 4.6–8)
PROT UR QL STRIP: NEGATIVE
SP GR UR STRIP: 1.01 (ref 1–1.03)
UA UROBILINOGEN AMB POC: NORMAL (ref 0.2–1)
URINALYSIS CLARITY POC: CLEAR
URINALYSIS COLOR POC: YELLOW
URINE BLOOD POC: NEGATIVE
URINE LEUKOCYTES POC: NEGATIVE
URINE NITRITES POC: NEGATIVE

## 2018-07-12 RX ORDER — NITROFURANTOIN 25; 75 MG/1; MG/1
100 CAPSULE ORAL 2 TIMES DAILY
Qty: 14 CAP | Refills: 0 | Status: SHIPPED | OUTPATIENT
Start: 2018-07-12 | End: 2018-08-15 | Stop reason: ALTCHOICE

## 2018-07-12 RX ORDER — CETIRIZINE HCL 10 MG
TABLET ORAL
COMMUNITY

## 2018-07-12 NOTE — PROGRESS NOTES
Chief Complaint   Patient presents with    Abdominal Pain    Urinary Pain     yesterday only     1. Have you been to the ER, urgent care clinic since your last visit? Hospitalized since your last visit? No    2. Have you seen or consulted any other health care providers outside of the [address] [place] [address] since your last visit? Include any pap smears or colon screening.  No    Health Maintenance reviewed

## 2018-07-12 NOTE — PROGRESS NOTES
Subjective:     Annia Fish is a 40 y.o. female who presents today with the following:  Chief Complaint   Patient presents with    Abdominal Pain     shooting pain, comes and goes    Urinary Pain     yesterday only     Patient presents with lower abdominal cramping and one incidence of pain on urination starting yesterday  Had some clear vaginal disharge last week non odorous, which seemed out of the blue. Is sexually active, does not use protection, but is monogamous. LMP 18. Is not concerned for STI. Has some urinary frequency and urgency recently. Denies blood in urine. Feels like she is emptying completely. Denies fever, chills, nausea, vomiting. Is not drinking water as she should.      ROS:  Gen: denies fever, chills, fatigue, weight loss, weight gain  HEENT:denies blurry vision, nasal congestion, sore throat  Resp: denies dypsnea, cough, wheezing  CV: denies chest pain, palpitations, lower extremity edema  Abd: denies nausea, vomiting, diarrhea, constipation  Neuro: denies numbness/tingling      No Known Allergies      Current Outpatient Prescriptions:     cetirizine (ZYRTEC) 10 mg tablet, Take  by mouth., Disp: , Rfl:     nitrofurantoin, macrocrystal-monohydrate, (MACROBID) 100 mg capsule, Take 1 Cap by mouth two (2) times a day., Disp: 14 Cap, Rfl: 0    budesonide (RHINOCORT AQUA) 32 mcg/actuation nasal spray, , Disp: , Rfl:     Past Medical History:   Diagnosis Date    GERD (gastroesophageal reflux disease)     occ    Morbid obesity ([address]     Thyroid disease     hyperthryoidism per patient       Past Surgical History:   Procedure Laterality Date    HX CHOLECYSTECTOMY  2018    Jose Beckham MD    HX GYN      BTL    HX GYN             History   Smoking Status    Never Smoker   Smokeless Tobacco    Never Used       Social History     Social History    Marital status: SINGLE     Spouse name: N/A    Number of children: N/A    Years of education: N/A     Social History Main Topics    Smoking status: Never Smoker    Smokeless tobacco: Never Used    Alcohol use Yes      Comment: social    Drug use: No    Sexual activity: Yes     Partners: Male     Birth control/ protection: Surgical     Other Topics Concern    None     Social History Narrative       Family History   Problem Relation Age of Onset    Hypertension Mother     Cancer Mother          Objective:     Visit Vitals    /84 (BP 1 Location: Left arm, BP Patient Position: Sitting)    Pulse 79    Temp 98.2 °F (36.8 °C) (Oral)    Resp 16    Ht 5' 3\" (1.6 m)    Wt 242 lb (109.8 kg)    LMP 06/20/2018    SpO2 99%    BMI 42.87 kg/m2     Gen: alert, oriented, no acute distress  Head: normocephalic, atraumatic  Eyes:sclera clear, conjunctiva clear  Oral: moist mucus membranes, no oral lesions, no pharyngeal exudate, no pharyngeal erythema  Neck: symmetric normal sized thyroid, no carotid bruits, no JVD  Resp: Normal work of breathing, lungs CTAB, no w/r/r  CV: S1, S2 normal.  No murmurs, rubs, or gallops. Abd:  Normal bowel sounds. Soft, not tender, not distended. No CVA tenderness. Results for orders placed or performed in visit on 07/12/18   AMB POC URINALYSIS DIP STICK MANUAL W/O MICRO   Result Value Ref Range    Color (UA POC) Yellow     Clarity (UA POC) Clear     Glucose (UA POC) Negative Negative    Bilirubin (UA POC) Negative Negative    Ketones (UA POC) Negative Negative    Specific gravity (UA POC) 1.015 1.001 - 1.035    Blood (UA POC) Negative Negative    pH (UA POC) 6.0 4.6 - 8.0    Protein (UA POC) Negative Negative    Urobilinogen (UA POC) 0.2 mg/dL 0.2 - 1    Nitrites (UA POC) Negative Negative    Leukocyte esterase (UA POC) Negative Negative       Assessment/ Plan:   Diagnoses and all orders for this visit:    1.  Dysuria  -     AMB POC URINALYSIS DIP STICK MANUAL W/O MICRO  -     CULTURE, URINE  -     CT/NG/T.VAGINALIS AMPLIFICATION  -     nitrofurantoin, macrocrystal-monohydrate, (MACROBID) 100 mg capsule; Take 1 Cap by mouth two (2) times a day. Given symptoms, patient elects to start treatment now and stop if no infection. Advised her also to increase fluid intake. Verbal and written instructions (see AVS) provided.  Patient expresses understanding of diagnosis and treatment plan. Angela Clark.  Berry Bean MD

## 2018-07-13 LAB
BACTERIA UR CULT: NORMAL
C TRACH RRNA SPEC QL NAA+PROBE: NEGATIVE
N GONORRHOEA RRNA SPEC QL NAA+PROBE: NEGATIVE
T VAGINALIS RRNA SPEC QL NAA+PROBE: NEGATIVE

## 2018-07-16 NOTE — PROGRESS NOTES
Spoke with Ms. Neto Huertas and told her Dr. Amadeo Samuel said \" no urinary infection so she can stop antibiotics. \"

## 2018-08-15 ENCOUNTER — OFFICE VISIT (OUTPATIENT)
Dept: FAMILY MEDICINE CLINIC | Age: 38
End: 2018-08-15

## 2018-08-15 VITALS
HEIGHT: 63 IN | OXYGEN SATURATION: 98 % | WEIGHT: 244 LBS | RESPIRATION RATE: 14 BRPM | BODY MASS INDEX: 43.23 KG/M2 | SYSTOLIC BLOOD PRESSURE: 104 MMHG | TEMPERATURE: 98.4 F | HEART RATE: 86 BPM | DIASTOLIC BLOOD PRESSURE: 76 MMHG

## 2018-08-15 DIAGNOSIS — H92.01 RIGHT EAR PAIN: ICD-10-CM

## 2018-08-15 DIAGNOSIS — M62.838 NECK MUSCLE SPASM: ICD-10-CM

## 2018-08-15 DIAGNOSIS — M54.2 NECK PAIN: ICD-10-CM

## 2018-08-15 DIAGNOSIS — M62.838 TRAPEZIUS MUSCLE SPASM: Primary | ICD-10-CM

## 2018-08-15 RX ORDER — DICLOFENAC SODIUM 75 MG/1
75 TABLET, DELAYED RELEASE ORAL
Qty: 60 TAB | Refills: 0 | Status: SHIPPED | OUTPATIENT
Start: 2018-08-15 | End: 2019-08-21

## 2018-08-15 RX ORDER — TIZANIDINE 4 MG/1
4 TABLET ORAL
Qty: 30 TAB | Refills: 0 | Status: SHIPPED | OUTPATIENT
Start: 2018-08-15 | End: 2020-04-20

## 2018-08-15 NOTE — PATIENT INSTRUCTIONS
Neck Strain or Sprain: Rehab Exercises  Your Care Instructions  Here are some examples of typical rehabilitation exercises for your condition. Start each exercise slowly. Ease off the exercise if you start to have pain. Your doctor or physical therapist will tell you when you can start these exercises and which ones will work best for you. How to do the exercises  Neck rotation    1. Sit in a firm chair, or stand up straight. 2. Keeping your chin level, turn your head to the right, and hold for 15 to 30 seconds. 3. Turn your head to the left and hold for 15 to 30 seconds. 4. Repeat 2 to 4 times to each side. Neck stretches    1. Look straight ahead, and tip your right ear to your right shoulder. Do not let your left shoulder rise up as you tip your head to the right. 2. Hold for 15 to 30 seconds. 3. Tilt your head to the left. Do not let your right shoulder rise up as you tip your head to the left. 4. Hold for 15 to 30 seconds. 5. Repeat 2 to 4 times to each side. Forward neck flexion    1. Sit in a firm chair, or stand up straight. 2. Bend your head forward. 3. Hold for 15 to 30 seconds. 4. Repeat 2 to 4 times. Lateral (side) bend strengthening    1. With your right hand, place your first two fingers on your right temple. 2. Start to bend your head to the side while using gentle pressure from your fingers to keep your head from bending. 3. Hold for about 6 seconds. 4. Repeat 8 to 12 times. 5. Switch hands and repeat the same exercise on your left side. Forward bend strengthening    1. Place your first two fingers of either hand on your forehead. 2. Start to bend your head forward while using gentle pressure from your fingers to keep your head from bending. 3. Hold for about 6 seconds. 4. Repeat 8 to 12 times. Neutral position strengthening    1. Using one hand, place your fingertips on the back of your head at the top of your neck.   2. Start to bend your head backward while using gentle pressure from your fingers to keep your head from bending. 3. Hold for about 6 seconds. 4. Repeat 8 to 12 times. Chin tuck    1. Lie on the floor with a rolled-up towel under your neck. Your head should be touching the floor. 2. Slowly bring your chin toward your chest.  3. Hold for a count of 6, and then relax for up to 10 seconds. 4. Repeat 8 to 12 times. Follow-up care is a key part of your treatment and safety. Be sure to make and go to all appointments, and call your doctor if you are having problems. It's also a good idea to know your test results and keep a list of the medicines you take. Where can you learn more? Go to http://alejandra-magali.info/. Enter M679 in the search box to learn more about \"Neck Strain or Sprain: Rehab Exercises. \"  Current as of: November 29, 2017  Content Version: 11.7  © 7629-9107 [employer]. Care instructions adapted under license by [employer] (which disclaims liability or warranty for this information). If you have questions about a medical condition or this instruction, always ask your healthcare professional. [address] any warranty or liability for your use of this information.

## 2018-08-15 NOTE — PROGRESS NOTES
Chief Complaint   Patient presents with    Shoulder Pain    Neck Pain     1. Have you been to the ER, urgent care clinic since your last visit? Hospitalized since your last visit? yes 3/25/18 gallbladder removed at [facility]    2. Have you seen or consulted any other health care providers outside of the [facility] since your last visit? Include any pap smears or colon screening.  No

## 2018-08-15 NOTE — MR AVS SNAPSHOT
[facility] 
 
 
 [address] 
[phone] Patient: Kris Palacios MRN:  [id number] Visit Information Date & Time Provider Department Dept. Phone Encounter #  
 8/15/2018  3:20 PM Laurie Tipton MD [address] [facility] [phone] [id number] Upcoming Health Maintenance Date Due DTaP/Tdap/Td series (1 - Tdap) 10/7/2001 PAP AKA CERVICAL CYTOLOGY 10/7/2001 Influenza Age 5 to Adult 8/15/2018* *Topic was postponed. The date shown is not the original due date. Allergies as of 8/15/2018  Review Complete On: 8/15/2018 By: Nga Hernandez LPN No Known Allergies Current Immunizations  Reviewed on 11/20/2014 Name Date Influenza Vaccine Víctor Gray) 11/20/2014 Not reviewed this visit You Were Diagnosed With   
  
 Codes Comments Trapezius muscle spasm    -  Primary ICD-10-CM: W17.144 ICD-9-CM: 728.85 Neck muscle spasm     ICD-10-CM: X66.606 ICD-9-CM: 728.85 Neck pain     ICD-10-CM: M54.2 ICD-9-CM: 723.1 Right ear pain     ICD-10-CM: H92.01 
ICD-9-CM: 388.70 Vitals BP Pulse Temp Resp Height(growth percentile) Weight(growth percentile) 104/76 (BP 1 Location: Left arm, BP Patient Position: Sitting) 86 98.4 °F (36.9 °C) (Oral) 14 5' 3\" (1.6 m) 244 lb (110.7 kg) LMP SpO2 BMI OB Status Smoking Status 07/25/2018 98% 43.22 kg/m2 Having regular periods Never Smoker Vitals History BMI and BSA Data Body Mass Index Body Surface Area  
 43.22 kg/m 2 2.22 m 2 Preferred Pharmacy Pharmacy Name Phone [phone] On license of [facility] [phone] Your Updated Medication List  
  
   
This list is accurate as of 8/15/18  3:55 PM.  Always use your most recent med list.  
  
  
  
  
 diclofenac EC 75 mg EC tablet Commonly known as:  VOLTAREN  
 Take 1 Tab by mouth two (2) times daily as needed. RHINOCORT AQUA 32 mcg/actuation nasal spray Generic drug:  budesonide  
  
 tiZANidine 4 mg tablet Commonly known as:  Thaliamiriam Ford Take 1 Tab by mouth nightly as needed. ZyrTEC 10 mg tablet Generic drug:  cetirizine Take  by mouth. Prescriptions Sent to Pharmacy Refills  
 tiZANidine (ZANAFLEX) 4 mg tablet 0 Sig: Take 1 Tab by mouth nightly as needed. Class: Normal  
 Pharmacy: [address] Ph #: [phone] Route: Oral  
 diclofenac EC (VOLTAREN) 75 mg EC tablet 0 Sig: Take 1 Tab by mouth two (2) times daily as needed. Class: Normal  
 Pharmacy: [address] Ph #: [phone] Route: Oral  
  
Patient Instructions Neck Strain or Sprain: Rehab Exercises Your Care Instructions Here are some examples of typical rehabilitation exercises for your condition. Start each exercise slowly. Ease off the exercise if you start to have pain. Your doctor or physical therapist will tell you when you can start these exercises and which ones will work best for you. How to do the exercises Neck rotation 1. Sit in a firm chair, or stand up straight. 2. Keeping your chin level, turn your head to the right, and hold for 15 to 30 seconds. 3. Turn your head to the left and hold for 15 to 30 seconds. 4. Repeat 2 to 4 times to each side. Neck stretches 1. Look straight ahead, and tip your right ear to your right shoulder. Do not let your left shoulder rise up as you tip your head to the right. 2. Hold for 15 to 30 seconds. 3. Tilt your head to the left. Do not let your right shoulder rise up as you tip your head to the left. 4. Hold for 15 to 30 seconds. 5. Repeat 2 to 4 times to each side. Forward neck flexion 1. Sit in a firm chair, or stand up straight. 2. Bend your head forward. 3. Hold for 15 to 30 seconds. 4. Repeat 2 to 4 times. Lateral (side) bend strengthening 1. With your right hand, place your first two fingers on your right temple. 2. Start to bend your head to the side while using gentle pressure from your fingers to keep your head from bending. 3. Hold for about 6 seconds. 4. Repeat 8 to 12 times. 5. Switch hands and repeat the same exercise on your left side. Forward bend strengthening 1. Place your first two fingers of either hand on your forehead. 2. Start to bend your head forward while using gentle pressure from your fingers to keep your head from bending. 3. Hold for about 6 seconds. 4. Repeat 8 to 12 times. Neutral position strengthening 1. Using one hand, place your fingertips on the back of your head at the top of your neck. 2. Start to bend your head backward while using gentle pressure from your fingers to keep your head from bending. 3. Hold for about 6 seconds. 4. Repeat 8 to 12 times. Chin tuck 1. Lie on the floor with a rolled-up towel under your neck. Your head should be touching the floor. 2. Slowly bring your chin toward your chest. 
3. Hold for a count of 6, and then relax for up to 10 seconds. 4. Repeat 8 to 12 times. Follow-up care is a key part of your treatment and safety. Be sure to make and go to all appointments, and call your doctor if you are having problems. It's also a good idea to know your test results and keep a list of the medicines you take. Where can you learn more? Go to http://alejandra-magali.info/. Enter M679 in the search box to learn more about \"Neck Strain or Sprain: Rehab Exercises. \" Current as of: November 29, 2017 Content Version: 11.7 © 4454-2369 [employer].  Care instructions adapted under license by [employer] (which disclaims liability or warranty for this information). If you have questions about a medical condition or this instruction, always ask your healthcare professional. [address] any warranty or liability for your use of this information. Introducing hospitals & [facility] SERVICES! [employer] introduces [employer] patient portal. Now you can access parts of your medical record, email your doctor's office, and request medication refills online. 1. In your internet browser, go to [email or web address] 2. Click on the First Time User? Click Here link in the Sign In box. You will see the New Member Sign Up page. 3. Enter your [employer] Access Code exactly as it appears below. You will not need to use this code after youve completed the sign-up process. If you do not sign up before the expiration date, you must request a new code. · [employer] Access Code: [email or web address] Expires: 10/10/2018  8:34 AM 
 
4. Enter the last four digits of your Social Security Number (xxxx) and Date of Birth (mm/dd/yyyy) as indicated and click Submit. You will be taken to the next sign-up page. 5. Create a [employer] ID. This will be your [employer] login ID and cannot be changed, so think of one that is secure and easy to remember. 6. Create a [employer] password. You can change your password at any time. 7. Enter your Password Reset Question and Answer. This can be used at a later time if you forget your password. 8. Enter your e-mail address. You will receive e-mail notification when new information is available in [address] 9. Click Sign Up. You can now view and download portions of your medical record. 10. Click the Download Summary menu link to download a portable copy of your medical information. If you have questions, please visit the Frequently Asked Questions section of the [employer] website. Remember, [employer] is NOT to be used for urgent needs. For medical emergencies, dial 911. Now available from your iPhone and Android! Please provide this summary of care documentation to your next provider. Your primary care clinician is listed as Blair Thompson. If you have any questions after today's visit, please call [phone].

## 2018-08-15 NOTE — PROGRESS NOTES
HPI  Kris Palacios is a 40 y.o. female who presents with right-sided neck and shoulder girdle pain. Has been going on for about 1 week. She is not sure what activity she did injure this. She recalls that it seemed to come on gradually over the course of the day. She is in training for nursing program.  They are doing some lifting but the patient is 90 pounds and they do team lifting. Has not done any unusual activity as far she knows. She is having trouble sleeping particularly when she tries to sleep on her right side. Prefers to sleep on her left. The pain will wake her up at night. She is trying naproxen with some relief. Has noticed a hot bath helps. Has not tried any stretches or exercises. She is having some right-sided ear pain and a throbbing sensation in her head in the occiput    PMHx:  Past Medical History:   Diagnosis Date    GERD (gastroesophageal reflux disease)     occ    Morbid obesity ([address]     Thyroid disease     hyperthryoidism per patient       Meds:   Current Outpatient Prescriptions   Medication Sig Dispense Refill    tiZANidine (ZANAFLEX) 4 mg tablet Take 1 Tab by mouth nightly as needed. 30 Tab 0    diclofenac EC (VOLTAREN) 75 mg EC tablet Take 1 Tab by mouth two (2) times daily as needed. 60 Tab 0    cetirizine (ZYRTEC) 10 mg tablet Take  by mouth.  budesonide (RHINOCORT AQUA) 32 mcg/actuation nasal spray          Allergies:   No Known Allergies    Smoker:  History   Smoking Status    Never Smoker   Smokeless Tobacco    Never Used       ETOH:   History   Alcohol Use    Yes     Comment: social       FH:   Family History   Problem Relation Age of Onset    Hypertension Mother     Cancer Mother        ROS:   As listed in HPI.  In addition:  Constitutional:   No headache, fever, fatigue, weight loss or weight gain      Cardiac:    No chest pain      Resp:   No cough or shortness of breath      Neuro   No loss of consciousness, dizziness, seizures      Physical Exam:  Blood pressure 104/76, pulse 86, temperature 98.4 °F (36.9 °C), temperature source Oral, resp. rate 14, height 5' 3\" (1.6 m), weight 244 lb (110.7 kg), last menstrual period 07/25/2018, SpO2 98 %. GEN: No apparent distress. Alert and oriented and responds to all questions appropriately. NEUROLOGIC:  No focal neurologic deficits. Strength and sensation grossly intact. Coordination and gait grossly intact. EXT: Well perfused. No edema. SKIN: No obvious rashes. Ears examined bilaterally. Clear external canals, clear tympanic membranes  Neck and back examined. Bilateral superior trapezius muscle spasms that are worse on the right. Scalene muscle spasm on the right, sternocleidomastoids muscle spasm on the right which is the primary pain generator       Assessment and Plan     Neck/trapezius muscle spasm. No radiculopathy  Unclear what she did to injure herself. Gave her some examples of common ways to injure these these muscles and she will think about it and try to correct new behaviors that might have given her this discomfort. Reassured her that her ears look normal.    Employed a cold stretch technique on the sternocleidomastoid and the right scalene. This provided immediate improvement in her range of motion and pain. Explained how she could reproduce these results at home using a heating pad. She feels like she needs something a little stronger than naproxen so-called diclofenac. Need something to help her get a better night sleep, called in Flexeril. She may not require these medications if she is able to employ behavioral techniques      ICD-10-CM ICD-9-CM    1. Trapezius muscle spasm M62.838 728.85 tiZANidine (ZANAFLEX) 4 mg tablet      diclofenac EC (VOLTAREN) 75 mg EC tablet   2. Neck muscle spasm M62.838 728.85    3. Neck pain M54.2 723.1    4. Right ear pain H92.01 388.70        AVS given.  Pt expressed understanding of instructions

## 2018-09-14 ENCOUNTER — DOCUMENTATION ONLY (OUTPATIENT)
Dept: FAMILY MEDICINE CLINIC | Age: 38
End: 2018-09-14

## 2018-09-14 ENCOUNTER — TELEPHONE (OUTPATIENT)
Dept: FAMILY MEDICINE CLINIC | Age: 38
End: 2018-09-14

## 2018-09-14 ENCOUNTER — OFFICE VISIT (OUTPATIENT)
Dept: FAMILY MEDICINE CLINIC | Age: 38
End: 2018-09-14

## 2018-09-14 VITALS
TEMPERATURE: 98.2 F | HEART RATE: 76 BPM | BODY MASS INDEX: 42.88 KG/M2 | OXYGEN SATURATION: 98 % | DIASTOLIC BLOOD PRESSURE: 83 MMHG | RESPIRATION RATE: 16 BRPM | HEIGHT: 63 IN | SYSTOLIC BLOOD PRESSURE: 116 MMHG | WEIGHT: 242 LBS

## 2018-09-14 DIAGNOSIS — H60.501 ACUTE OTITIS EXTERNA OF RIGHT EAR, UNSPECIFIED TYPE: Primary | ICD-10-CM

## 2018-09-14 DIAGNOSIS — H61.21 IMPACTED CERUMEN OF RIGHT EAR: ICD-10-CM

## 2018-09-14 RX ORDER — AMOXICILLIN AND CLAVULANATE POTASSIUM 875; 125 MG/1; MG/1
1 TABLET, FILM COATED ORAL 2 TIMES DAILY
Qty: 14 TAB | Refills: 0 | Status: SHIPPED | OUTPATIENT
Start: 2018-09-14 | End: 2018-09-24

## 2018-09-14 RX ORDER — NEOMYCIN SULFATE, POLYMYXIN B SULFATE AND HYDROCORTISONE 10; 3.5; 1 MG/ML; MG/ML; [USP'U]/ML
3 SUSPENSION/ DROPS AURICULAR (OTIC) 4 TIMES DAILY
Qty: 5 ML | Refills: 0 | Status: SHIPPED | OUTPATIENT
Start: 2018-09-14 | End: 2018-09-24

## 2018-09-14 NOTE — PROGRESS NOTES
Subjective:   Richi Fink is a 40 y.o. female who complains of nasal drainage, sneezing, no fever. Some right ear pain present for a week. Took some benadryl on and off, and daily cetirizine. Took some aleve for the pain. Past Medical History:   Diagnosis Date    GERD (gastroesophageal reflux disease)     occ    Morbid obesity (HCC)     Thyroid disease     hyperthryoidism per patient     Social History   Substance Use Topics    Smoking status: Never Smoker    Smokeless tobacco: Never Used    Alcohol use Yes      Comment: social     Outpatient Prescriptions Marked as Taking for the 9/14/18 encounter (Office Visit) with César Ibrahim MD   Medication Sig Dispense Refill    bacitracin-neomycin-polymyxin b-hydrocortisone (CORTISPORIN) 1 % ointment Apply  to affected area two (2) times a day. 15 g 0    amoxicillin-clavulanate (AUGMENTIN) 875-125 mg per tablet Take 1 Tab by mouth two (2) times a day for 10 days. 14 Tab 0    cetirizine (ZYRTEC) 10 mg tablet Take  by mouth.  budesonide (RHINOCORT AQUA) 32 mcg/actuation nasal spray        No Known Allergies     Review of Systems  A comprehensive review of systems was negative except for that written in the HPI. Objective:     Visit Vitals    /83 (BP 1 Location: Left arm, BP Patient Position: Sitting)    Pulse 76    Temp 98.2 °F (36.8 °C) (Oral)    Resp 16    Ht 5' 3\" (1.6 m)    Wt 242 lb (109.8 kg)    LMP 08/24/2018    SpO2 98%    BMI 42.87 kg/m2     General:   alert, cooperative   Eyes: conjunctivae/scleras clear. PERRL, EOM's intact   Ears: Right EAC occluded with cerumen - removed and lavaged to reveal local irritation and clear drainage   Sinuses/Nose: No maxillary or frontal tenderness. clear rhinorrhea present.    Mouth:  No oral lesions, mild pharyngeal erythema, no exudates       Heart: S1 and S2 normal,no murmurs noted    Lungs: Coarse to auscultation bilaterally, no increased work of breathing           Assessment/Plan: 1. Acute otitis externa of right ear, unspecified type    2. Impacted cerumen of right ear      Appears to be infection behind cerumen plug. Treat with oral and topical antibiotics. Orders Placed This Encounter    bacitracin-neomycin-polymyxin b-hydrocortisone (CORTISPORIN) 1 % ointment     Sig: Apply  to affected area two (2) times a day. Dispense:  15 g     Refill:  0    amoxicillin-clavulanate (AUGMENTIN) 875-125 mg per tablet     Sig: Take 1 Tab by mouth two (2) times a day for 10 days. Dispense:  14 Tab     Refill:  0         Verbal and written instructions (see AVS) provided. Patient expresses understanding of diagnosis and treatment plan.

## 2018-09-14 NOTE — TELEPHONE ENCOUNTER
Spoke with patient. Told her ear drops have been called into [facility] pharmacy. She voiced understanding.

## 2018-09-14 NOTE — MR AVS SNAPSHOT
[facility] 
 
 
 [address] 
[phone] Patient: Dimas Osorio MRN:  [id number] Visit Information Date & Time Provider Department Dept. Phone Encounter #  
 9/14/2018 11:00 AM Yash Rivas, [facility] [phone] [id number] Upcoming Health Maintenance Date Due DTaP/Tdap/Td series (1 - Tdap) 10/7/2001 PAP AKA CERVICAL CYTOLOGY 10/7/2001 Influenza Age 5 to Adult 8/1/2018 Allergies as of 9/14/2018  Review Complete On: 9/14/2018 By: Juliette Luciano No Known Allergies Current Immunizations  Reviewed on 11/20/2014 Name Date Influenza Vaccine Geovany Jett) 11/20/2014 Not reviewed this visit You Were Diagnosed With   
  
 Codes Comments Acute otitis externa of right ear, unspecified type    -  Primary ICD-10-CM: H60.501 ICD-9-CM: 380.10 Vitals BP Pulse Temp Resp Height(growth percentile) Weight(growth percentile) 116/83 (BP 1 Location: Left arm, BP Patient Position: Sitting) 76 98.2 °F (36.8 °C) (Oral) 16 5' 3\" (1.6 m) 242 lb (109.8 kg) LMP SpO2 BMI OB Status Smoking Status 08/24/2018 98% 42.87 kg/m2 Having regular periods Never Smoker Vitals History BMI and BSA Data Body Mass Index Body Surface Area  
 42.87 kg/m 2 2.21 m 2 Preferred Pharmacy Pharmacy Name Phone [facility], [address] [phone] Your Updated Medication List  
  
   
This list is accurate as of 9/14/18 12:02 PM.  Always use your most recent med list.  
  
  
  
  
 amoxicillin-clavulanate 875-125 mg per tablet Commonly known as:  AUGMENTIN Take 1 Tab by mouth two (2) times a day for 10 days. bacitracin-neomycin-polymyxin b-hydrocortisone 1 % ointment Commonly known as:  CORTISPORIN Apply  to affected area two (2) times a day. diclofenac EC 75 mg EC tablet Commonly known as:  VOLTAREN Take 1 Tab by mouth two (2) times daily as needed. RHINOCORT AQUA 32 mcg/actuation nasal spray Generic drug:  budesonide  
  
 tiZANidine 4 mg tablet Commonly known as:  Tosha Banter Take 1 Tab by mouth nightly as needed. ZyrTEC 10 mg tablet Generic drug:  cetirizine Take  by mouth. Prescriptions Sent to Pharmacy Refills  
 bacitracin-neomycin-polymyxin b-hydrocortisone (CORTISPORIN) 1 % ointment 0 Sig: Apply  to affected area two (2) times a day. Class: Normal  
 Pharmacy: [address] Ph #: [phone] Route: Topical  
 amoxicillin-clavulanate (AUGMENTIN) 875-125 mg per tablet 0 Sig: Take 1 Tab by mouth two (2) times a day for 10 days. Class: Normal  
 Pharmacy: [address] Ph #: [phone] Route: Oral  
  
Introducing [facility]! Conner Szymanski introduces [employer] patient portal. Now you can access parts of your medical record, email your doctor's office, and request medication refills online. 1. In your internet browser, go to [email or web address] 2. Click on the First Time User? Click Here link in the Sign In box. You will see the New Member Sign Up page. 3. Enter your [employer] Access Code exactly as it appears below. You will not need to use this code after youve completed the sign-up process. If you do not sign up before the expiration date, you must request a new code. · [employer] Access Code: [email or web address] Expires: 10/10/2018  8:34 AM 
 
4. Enter the last four digits of your Social Security Number (xxxx) and Date of Birth (mm/dd/yyyy) as indicated and click Submit. You will be taken to the next sign-up page. 5. Create a [employer] ID. This will be your [employer] login ID and cannot be changed, so think of one that is secure and easy to remember. 6. Create a [facility] password. You can change your password at any time. 7. Enter your Password Reset Question and Answer. This can be used at a later time if you forget your password. 8. Enter your e-mail address. You will receive e-mail notification when new information is available in [address] 9. Click Sign Up. You can now view and download portions of your medical record. 10. Click the Download Summary menu link to download a portable copy of your medical information. If you have questions, please visit the Frequently Asked Questions section of the [facility] website. Remember, [facility] is NOT to be used for urgent needs. For medical emergencies, dial 911. Now available from your iPhone and Android! Please provide this summary of care documentation to your next provider. Your primary care clinician is listed as Bear Jung. If you have any questions after today's visit, please call [phone].

## 2018-09-14 NOTE — PROGRESS NOTES
bacitracin-neomycin-polymyxin b-hydrocortisone ear drops called in to [address] as ordered by Dr. Nic Taveras. 2 drops in affected ear 2 times daily.

## 2018-09-14 NOTE — PROGRESS NOTES
Chief Complaint   Patient presents with    Ear Pain     raidiates up toward head. ... gets worse when she lays down    Nasal Congestion     1. Have you been to the ER, urgent care clinic since your last visit? Hospitalized since your last visit? No    2. Have you seen or consulted any other health care providers outside of the [address] [place] [address] since your last visit? Include any pap smears or colon screening.  No    Health Maintenance reviewed

## 2018-09-14 NOTE — TELEPHONE ENCOUNTER
Spoke with MsAlejandrina Alex. She said she had the jitters and felt like her heart was racing. Hx of Augmentin use in the past with no known side effects. She states she did take it with a pepsi. Advised to take medication with food, drink plenty of water, and to limit caffeine intake per Brad Martinez NP. She voiced understanding.

## 2019-02-23 ENCOUNTER — APPOINTMENT (OUTPATIENT)
Dept: CT IMAGING | Age: 39
End: 2019-02-23
Attending: EMERGENCY MEDICINE
Payer: MEDICAID

## 2019-02-23 ENCOUNTER — HOSPITAL ENCOUNTER (EMERGENCY)
Age: 39
Discharge: HOME | End: 2019-02-23
Attending: EMERGENCY MEDICINE
Payer: MEDICAID

## 2019-02-23 VITALS
OXYGEN SATURATION: 99 % | RESPIRATION RATE: 16 BRPM | DIASTOLIC BLOOD PRESSURE: 84 MMHG | TEMPERATURE: 98.5 F | HEART RATE: 66 BPM | SYSTOLIC BLOOD PRESSURE: 143 MMHG

## 2019-02-23 DIAGNOSIS — R51.9 NONINTRACTABLE EPISODIC HEADACHE, UNSPECIFIED HEADACHE TYPE: Primary | ICD-10-CM

## 2019-02-23 PROCEDURE — 99282 EMERGENCY DEPT VISIT SF MDM: CPT

## 2019-02-23 PROCEDURE — 70450 CT HEAD/BRAIN W/O DYE: CPT

## 2019-02-23 NOTE — DISCHARGE INSTRUCTIONS
Return to ER for any persistent headache, change in pain, fever, nausea, vomiting       Headache: Care Instructions  Your Care Instructions    Headaches have many possible causes. Most headaches aren't a sign of a more serious problem, and they will get better on their own. Home treatment may help you feel better faster. The doctor has checked you carefully, but problems can develop later. If you notice any problems or new symptoms, get medical treatment right away. Follow-up care is a key part of your treatment and safety. Be sure to make and go to all appointments, and call your doctor if you are having problems. It's also a good idea to know your test results and keep a list of the medicines you take. How can you care for yourself at home? · Do not drive if you have taken a prescription pain medicine. · Rest in a quiet, dark room until your headache is gone. Close your eyes and try to relax or go to sleep. Don't watch TV or read. · Put a cold, moist cloth or cold pack on the painful area for 10 to 20 minutes at a time. Put a thin cloth between the cold pack and your skin. · Use a warm, moist towel or a heating pad set on low to relax tight shoulder and neck muscles. · Have someone gently massage your neck and shoulders. · Take pain medicines exactly as directed. ? If the doctor gave you a prescription medicine for pain, take it as prescribed. ? If you are not taking a prescription pain medicine, ask your doctor if you can take an over-the-counter medicine. · Be careful not to take pain medicine more often than the instructions allow, because you may get worse or more frequent headaches when the medicine wears off. · Do not ignore new symptoms that occur with a headache, such as a fever, weakness or numbness, vision changes, or confusion. These may be signs of a more serious problem. To prevent headaches  · Keep a headache diary so you can figure out what triggers your headaches.  Avoiding triggers may help you prevent headaches. Record when each headache began, how long it lasted, and what the pain was like (throbbing, aching, stabbing, or dull). Write down any other symptoms you had with the headache, such as nausea, flashing lights or dark spots, or sensitivity to bright light or loud noise. Note if the headache occurred near your period. List anything that might have triggered the headache, such as certain foods (chocolate, cheese, wine) or odors, smoke, bright light, stress, or lack of sleep. · Find healthy ways to deal with stress. Headaches are most common during or right after stressful times. Take time to relax before and after you do something that has caused a headache in the past.  · Try to keep your muscles relaxed by keeping good posture. Check your jaw, face, neck, and shoulder muscles for tension, and try relaxing them. When sitting at a desk, change positions often, and stretch for 30 seconds each hour. · Get plenty of sleep and exercise. · Eat regularly and well. Long periods without food can trigger a headache. · Treat yourself to a massage. Some people find that regular massages are very helpful in relieving tension. · Limit caffeine by not drinking too much coffee, tea, or soda. But don't quit caffeine suddenly, because that can also give you headaches. · Reduce eyestrain from computers by blinking frequently and looking away from the computer screen every so often. Make sure you have proper eyewear and that your monitor is set up properly, about an arm's length away. · Seek help if you have depression or anxiety. Your headaches may be linked to these conditions. Treatment can both prevent headaches and help with symptoms of anxiety or depression. When should you call for help? Call 911 anytime you think you may need emergency care. For example, call if:    · You have signs of a stroke.  These may include:  ? Sudden numbness, paralysis, or weakness in your face, arm, or leg, especially on only one side of your body. ? Sudden vision changes. ? Sudden trouble speaking. ? Sudden confusion or trouble understanding simple statements. ? Sudden problems with walking or balance. ? A sudden, severe headache that is different from past headaches.    Call your doctor now or seek immediate medical care if:    · You have a new or worse headache.     · Your headache gets much worse. Where can you learn more? Go to http://alejandra-magali.info/. Enter M271 in the search box to learn more about \"Headache: Care Instructions. \"  Current as of: Kisha 3, 2018  Content Version: 11.9  © 5960-2631 [employer]. Care instructions adapted under license by [employer] (which disclaims liability or warranty for this information). If you have questions about a medical condition or this instruction, always ask your healthcare professional. [address] any warranty or liability for your use of this information. Patient Education        Neuropathic Pain: Care Instructions  Your Care Instructions    Neuropathic pain is caused by pressure on or damage to your nerves. It's often simply called nerve pain. Some people feel this type of pain all the time. For others, it comes and goes. Diabetes, shingles, or an injury can cause nerve pain. Many people say the pain feels sharp, burning, or stabbing. But some people feel it as a dull ache. In some cases, it makes your skin very sensitive. So touch, pressure, and other sensations that did not hurt before may now cause pain. It's important to know that this kind of pain is real and can affect your quality of life. It's also important to know that treatment can help. Treatment includes pain medicines, exercise, and physical therapy. Medicines can help reduce the number of pain signals that travel over the nerves. This can make the painful areas less sensitive.  It can also help you sleep better and improve your mood. But medicines are only one part of successful treatment. Most people do best with more than one kind of treatment. Your doctor may recommend that you try cognitive-behavioral therapy and stress management. Or, if needed, you may decide to try to quit smoking, lower your blood pressure, or better control blood sugar. These kinds of healthy changes can also make a difference. If you feel that your treatment is not working, talk to your doctor. And be sure to tell your doctor if you think you might be depressed or anxious. These are common problems that can also be treated. Follow-up care is a key part of your treatment and safety. Be sure to make and go to all appointments, and call your doctor if you are having problems. It's also a good idea to know your test results and keep a list of the medicines you take. How can you care for yourself at home? · Be safe with medicines. Read and follow all instructions on the label. ? If the doctor gave you a prescription medicine for pain, take it as prescribed. ? If you are not taking a prescription pain medicine, ask your doctor if you can take an over-the-counter medicine. · Save hard tasks for days when you have less pain. Follow a hard task with an easy task. And remember to take breaks. · Relax, and reduce stress. You may want to try deep breathing or meditation. These can help. · Keep moving. Gentle, daily exercise can help reduce pain. Your doctor or physical therapist can tell you what type of exercise is best for you. This may include walking, swimming, and stationary biking. It may also include stretches and range-of-motion exercises. · Try heat, cold packs, and massage. · Get enough sleep. Constant pain can make you more tired. If the pain makes it hard to sleep, talk with your doctor. · Think positively. Your thoughts can affect your pain. Do fun things to distract yourself from the pain.  See a movie, read a book, listen to music, or spend time with a friend. · Keep a pain diary. Try to write down how strong your pain is and what it feels like. Also try to notice and write down how your moods, thoughts, sleep, activities, and medicine affect your pain. These notes can help you and your doctor find the best ways to treat your pain. Reducing constipation caused by pain medicine  Pain medicines often cause constipation. To reduce constipation:  · Include fruits, vegetables, beans, and whole grains in your diet each day. These foods are high in fiber. · Drink plenty of fluids, enough so that your urine is light yellow or clear like water. If you have kidney, heart, or liver disease and have to limit fluids, talk with your doctor before you increase the amount of fluids you drink. · Get some exercise every day. Build up slowly to 30 to 60 minutes a day on 5 or more days of the week. · Take a fiber supplement, such as Citrucel or Metamucil, every day if needed. Read and follow all instructions on the label. · Schedule time each day for a bowel movement. Having a daily routine may help. Take your time and do not strain when having a bowel movement. · Ask your doctor about a laxative. The goal is to have one easy bowel movement every 1 to 2 days. Do not let constipation go untreated for more than 3 days. When should you call for help? Call your doctor now or seek immediate medical care if:    · You feel sad, anxious, or hopeless for more than a few days. This could mean you are depressed. Depression is common in people who have a lot of pain. But it can be treated.     · You have trouble with bowel movements, such as:  ? No bowel movement in 3 days. ? Blood in the anal area, in your stool, or on the toilet paper.   ? Diarrhea for more than 24 hours.    Watch closely for changes in your health, and be sure to contact your doctor if:    · Your pain is getting worse.     · You can't sleep because of pain.     · You are very worried or anxious about your pain.     · You have trouble taking your pain medicine.     · You have any concerns about your pain medicine or its side effects.     · You have vomiting or cramps for more than 2 hours. Where can you learn more? Go to http://alejandra-magali.info/. Enter A271 in the search box to learn more about \"Neuropathic Pain: Care Instructions. \"  Current as of: Kisha 3, 2018  Content Version: 11.9  © 8375-3308 [employer]. Care instructions adapted under license by [employer] (which disclaims liability or warranty for this information). If you have questions about a medical condition or this instruction, always ask your healthcare professional. [place] [address] any warranty or liability for your use of this information.

## 2019-02-23 NOTE — ED NOTES
PA reviewed discharge instructions and options with patient and patient verbalized understanding. RN reviewed discharge instructions using teachback method. Pt ambulated to exit without difficulty and in no signs of acute distress escorted by family who  will drive home. No complaints or needs expressed at this time. VSS at time of discharge. Pt to call PCP in the morning for follow up.

## 2019-02-23 NOTE — ED PROVIDER NOTES
44 yo female presents to ER for evaluation of pain to back of right side of head. Pain described as tenderness and soreness. Pain has been ongoing for 1 week. Pain intermittent. Pain lasts for hours unless medications taken. Pt taking advil which helps. No fever or chills, no nausea or vomiting. Mild congestion no other uri symptoms. No blurry vision or double vision. No eye pain or temporal pain. No chest pain, shortness of breath or difficulty breathing. No muscle aches or body aches. Pt has had this pain in the past. 
 
Social hx 
 Nonsmoker Occasional alcohol The history is provided by the patient. Headache Pertinent negatives include no fever, no shortness of breath, no weakness, no dizziness, no nausea and no vomiting. Eye Pain Pertinent negatives include no photophobia, no eye redness, no nausea, no vomiting, no weakness, no fever, no pain and no dizziness. Past Medical History:  
Diagnosis Date  GERD (gastroesophageal reflux disease)   
 occ  Morbid obesity ([address]  Thyroid disease   
 hyperthryoidism per patient Past Surgical History:  
Procedure Laterality Date  HX CHOLECYSTECTOMY  2018 Rj Choudhary MD  
 HX GYN    
 BTL  
 HX GYN    
  Family History:  
Problem Relation Age of Onset  Hypertension Mother  Cancer Mother Social History Socioeconomic History  Marital status: SINGLE Spouse name: Not on file  Number of children: Not on file  Years of education: Not on file  Highest education level: Not on file Social Needs  Financial resource strain: Not on file  Food insecurity - worry: Not on file  Food insecurity - inability: Not on file  Transportation needs - medical: Not on file  Transportation needs - non-medical: Not on file Occupational History  Not on file Tobacco Use  Smoking status: Never Smoker  Smokeless tobacco: Never Used Substance and Sexual Activity  Alcohol use: Yes Comment: social  
 Drug use: No  
 Sexual activity: Yes  
  Partners: Male Birth control/protection: Surgical  
Other Topics Concern  Not on file Social History Narrative  Not on file ALLERGIES: Patient has no known allergies. Review of Systems Constitutional: Negative for chills and fever. HENT: Negative for congestion. Eyes: Negative for photophobia, pain, redness and visual disturbance. Respiratory: Negative for cough, chest tightness and shortness of breath. Cardiovascular: Negative for chest pain. Gastrointestinal: Negative for abdominal pain, nausea and vomiting. Musculoskeletal: Negative for back pain, myalgias, neck pain and neck stiffness. Skin: Negative for color change and rash. Neurological: Positive for headaches. Negative for dizziness and weakness. All other systems reviewed and are negative. There were no vitals filed for this visit. Physical Exam  
Constitutional: She is oriented to person, place, and time. She appears well-developed and well-nourished. No distress. HENT:  
Head: Normocephalic and atraumatic. Nose: Nose normal.  
Mouth/Throat: Oropharynx is clear and moist. No oropharyngeal exudate. Eyes: Conjunctivae and EOM are normal. Pupils are equal, round, and reactive to light. Neck: Normal range of motion. Neck supple. Painless FROM of neck. No meningeal signs. Cardiovascular: Normal rate and regular rhythm. Pulmonary/Chest: Effort normal and breath sounds normal. No respiratory distress. Abdominal: Soft. Bowel sounds are normal. She exhibits no mass. There is no tenderness. There is no rebound and no guarding. Musculoskeletal: Normal range of motion. She exhibits no edema or tenderness. 5/5  strength bilaterally Neurological: She is alert and oriented to person, place, and time. She has normal reflexes. She displays normal reflexes.  No cranial nerve deficit. She exhibits normal muscle tone. Coordination normal.  
 
2+ ac, 2+ patellar reflexes bilaterally Cranial Nerves: 
I: smell  Not tested  
II: pupils  Equal, round, reactive to light  
III,VII: ptosis  none  
III,IV,VI: extraocular muscles   normal  
V: mastication  normal  
V: facial light touch sensation   normal  
VII: facial muscle function   symmetric  
VIII: hearing  symmetric  
IX: soft palate elevation   normal  
XI: sternocleidomastoid strength  5/5  
XII: tongue   midline  
   
  
Skin: Skin is warm and dry. No rash noted. No erythema. Psychiatric: She has a normal mood and affect. Her behavior is normal. Judgment and thought content normal.  
Nursing note and vitals reviewed. MDM Number of Diagnoses or Management Options Diagnosis management comments: 44 yo female presenting to ER for right posterior head pain. No injury or trauma. No fever. No neuro deficits on exam. 
P: CT 
 
5:28 PM 
CT with no acute process. Pt with no pain. Patient is well hydrated, well appearing, and in no respiratory distress. Physical exam is reassuring, and without signs of serious illness. Will discharge patient home with supportive care, and follow-up with PCP within the next few days. Patient's results have been reviewed with them. Patient and/or family have verbally conveyed their understanding and agreement of the patient's signs, symptoms, diagnosis, treatment and prognosis and additionally agree to follow up as recommended or return to the Emergency Room should their condition change prior to follow-up. Discharge instructions have also been provided to the patient with some educational information regarding their diagnosis as well a list of reasons why they would want to return to the ER prior to their follow-up appointment should their condition change. Amount and/or Complexity of Data Reviewed Discuss the patient with other providers: yes (ER attendingDes) Patient Progress Patient progress: stable Procedures Pt case including HPI, PE, and all available lab and radiology results has been discussed with attending physician. Opportunity to evaluate patient has been provided to ER attending. Discharge and prescription plan has been agreed upon.

## 2019-02-23 NOTE — ED TRIAGE NOTES
TRIAGE: Pt arrives stating the R side of her head is sore when she touches it or lays on it, says this has been going on for 1 week. Described as dull and throbbing. States this has happened before, pt has been taking advil, denies fever, chills, NVD, +congestion

## 2019-06-25 ENCOUNTER — OFFICE VISIT (OUTPATIENT)
Dept: FAMILY MEDICINE CLINIC | Age: 39
End: 2019-06-25

## 2019-06-25 VITALS
BODY MASS INDEX: 42.33 KG/M2 | HEIGHT: 63 IN | WEIGHT: 238.9 LBS | TEMPERATURE: 98.9 F | OXYGEN SATURATION: 100 % | RESPIRATION RATE: 14 BRPM | DIASTOLIC BLOOD PRESSURE: 77 MMHG | HEART RATE: 80 BPM | SYSTOLIC BLOOD PRESSURE: 110 MMHG

## 2019-06-25 DIAGNOSIS — N61.1 ABSCESS OF RIGHT BREAST: ICD-10-CM

## 2019-06-25 DIAGNOSIS — J02.9 SORE THROAT: ICD-10-CM

## 2019-06-25 DIAGNOSIS — E66.01 OBESITY, MORBID (HCC): Primary | ICD-10-CM

## 2019-06-25 RX ORDER — ISOPROPYL ALCOHOL 99 %
SOLUTION, NON-ORAL MISCELLANEOUS
Refills: 2 | COMMUNITY
Start: 2019-04-25 | End: 2020-04-20

## 2019-06-25 RX ORDER — SULFAMETHOXAZOLE AND TRIMETHOPRIM 800; 160 MG/1; MG/1
1 TABLET ORAL 2 TIMES DAILY
Qty: 20 TAB | Refills: 0 | Status: SHIPPED | OUTPATIENT
Start: 2019-06-25 | End: 2019-07-05

## 2019-06-25 RX ORDER — OXYCODONE AND ACETAMINOPHEN 5; 325 MG/1; MG/1
TABLET ORAL
COMMUNITY
Start: 2018-03-23 | End: 2020-04-20

## 2019-06-25 RX ORDER — CODEINE PHOSPHATE AND GUAIFENESIN 10; 100 MG/5ML; MG/5ML
SOLUTION ORAL
Refills: 0 | COMMUNITY
Start: 2019-04-25 | End: 2020-04-20

## 2019-06-25 NOTE — PROGRESS NOTES
Chief Complaint   Patient presents with    Breast Problem     RIGHT SIDE BREAST LUMP. FIRST NOTICED PAIN GERDA AND DESCRIBES THE AREA AS RED. Pt reports that she woke up 3-4 days ago and noted soreness on her right lateral breast, then became more tender and noted redness. Pt also reports a sore throat. Subjective: (As above and below)     Chief Complaint   Patient presents with    Breast Problem     RIGHT SIDE BREAST LUMP. FIRST NOTICED PAIN SUNDAY AND DESCRIBES THE AREA AS RED. she is a 45y.o. year old female who presents for evaluation. Reviewed PmHx, RxHx, FmHx, SocHx, AllgHx and updated in chart. Review of Systems - negative except as listed above    Objective:     Vitals:    06/25/19 1526   BP: 110/77   Pulse: 80   Resp: 14   Temp: 98.9 °F (37.2 °C)   TempSrc: Oral   SpO2: 100%   Weight: 238 lb 14.4 oz (108.4 kg)   Height: 5' 3\" (1.6 m)     Physical Examination: General appearance - alert, well appearing, and in no distress  Mental status - normal mood, behavior, speech, dress, motor activity, and thought processes  Mouth - mucous membranes moist, pharynx normal without lesions  Chest - clear to auscultation, no wheezes, rales or rhonchi, symmetric air entry  Heart - normal rate, regular rhythm, normal S1, S2, no murmurs, rubs, clicks or gallops  Skin -right lateral inferior erythematous abscess noted with minimal central fluctuance, tender to touch with mild warmth    Assessment/ Plan:   1. Obesity, morbid ([address]  Continue to work on diet and exercise    2. Abscess of right breast  Take medication as written, not ready for I&D at this time. Advised patient to apply warm compresses. - trimethoprim-sulfamethoxazole (BACTRIM DS, SEPTRA DS) 160-800 mg per tablet; Take 1 Tab by mouth two (2) times a day for 10 days. Dispense: 20 Tab; Refill: 0    3.  Sore throat  Normal exam at this time, probably viral.  Advised patient to monitor and follow-up if symptoms are worse     Follow-up for abscess reevaluation in 1 week  I have discussed the diagnosis with the patient and the intended plan as seen in the above orders. The patient has received an after-visit summary and questions were answered concerning future plans.      Medication Side Effects and Warnings were discussed with patient: yes  Patient Labs were reviewed: yes  Patient Past Records were reviewed:  yes    Tamanna Erwin M.D.

## 2019-06-27 ENCOUNTER — TELEPHONE (OUTPATIENT)
Dept: FAMILY MEDICINE CLINIC | Age: 39
End: 2019-06-27

## 2019-06-27 NOTE — TELEPHONE ENCOUNTER
Patient calling to speak with a nurse. She stated that she is not sure if she is having a reaction to the antibiotic that was prescribed.

## 2019-08-21 ENCOUNTER — TELEPHONE (OUTPATIENT)
Dept: FAMILY MEDICINE CLINIC | Age: 39
End: 2019-08-21

## 2019-08-21 RX ORDER — DICLOFENAC SODIUM 75 MG/1
75 TABLET, DELAYED RELEASE ORAL
Qty: 60 TAB | Refills: 0 | Status: SHIPPED | OUTPATIENT
Start: 2019-08-21 | End: 2020-04-20

## 2019-08-21 NOTE — TELEPHONE ENCOUNTER
Patient has an appt to get a tooth taken out on Friday but is in a lot of pain. She would like to have something   Called in to [address] [place] [address].  Her number is [phone]

## 2020-04-20 ENCOUNTER — VIRTUAL VISIT (OUTPATIENT)
Dept: FAMILY MEDICINE CLINIC | Age: 40
End: 2020-04-20

## 2020-04-20 VITALS — WEIGHT: 238 LBS | HEIGHT: 63 IN | BODY MASS INDEX: 42.17 KG/M2

## 2020-04-20 DIAGNOSIS — K21.9 GASTROESOPHAGEAL REFLUX DISEASE, ESOPHAGITIS PRESENCE NOT SPECIFIED: ICD-10-CM

## 2020-04-20 DIAGNOSIS — R19.5 LOOSE STOOLS: ICD-10-CM

## 2020-04-20 DIAGNOSIS — R10.9 ABDOMINAL CRAMPING: Primary | ICD-10-CM

## 2020-04-20 RX ORDER — DICYCLOMINE HYDROCHLORIDE 10 MG/1
10 CAPSULE ORAL 3 TIMES DAILY
Qty: 90 CAP | Refills: 0 | Status: SHIPPED | OUTPATIENT
Start: 2020-04-20 | End: 2020-12-22 | Stop reason: ALTCHOICE

## 2020-04-20 RX ORDER — OMEPRAZOLE 40 MG/1
40 CAPSULE, DELAYED RELEASE ORAL AS NEEDED
COMMUNITY
Start: 2020-04-18

## 2020-04-20 NOTE — PROGRESS NOTES
Chief Complaint   Patient presents with    Heartburn    Medication Evaluation     1. Have you been to the ER, urgent care clinic since your last visit? Hospitalized since your last visit? Yes When: went to the ER for heartburn    2. Have you seen or consulted any other health care providers outside of the [address] [place] [address] since your last visit? Include any pap smears or colon screening. No    Health maintenance reviewed. Pt informed of health maintenance past due and/or upcoming. Pt verbalized understanding.      Health Maintenance Due   Topic Date Due    DTaP/Tdap/Td series (1 - Tdap) 10/07/2001    PAP AKA CERVICAL CYTOLOGY  10/07/2001

## 2020-04-20 NOTE — PROGRESS NOTES
THIS VISIT WAS COMPLETED VIRTUALLY USING DOXY. [facility] [place] [facility]  Gregg Tsai is a 44 y.o. female who presents with abdominal cramping and pain. Had some \"bad salmon\" on Friday woke up in the wee hours on Saturday with abdominal pain and a sensation of heartburn. Went to the stand-alone emergency room and was given Maalox which she apparently had an allergic reaction to and a PPI. She has been taking an H2 blocker instead and has had some relief in the heartburn symptoms. She has had some lower GI symptoms as well. Gets an intense cramping a couple of times per day. 1 of these episodes is typically followed by loose bowel movement that relieves the discomfort. This is unusual for her. She is only having 1 or 2 bowel movements per day    PMHx:  Past Medical History:   Diagnosis Date    GERD (gastroesophageal reflux disease)     occ    Morbid obesity ([address]     Thyroid disease     hyperthryoidism per patient       Meds:   Current Outpatient Medications   Medication Sig Dispense Refill    omeprazole (PRILOSEC) 40 mg capsule 40 mg.      dicyclomine (BENTYL) 10 mg capsule Take 1 Cap by mouth three (3) times daily. 90 Cap 0    cetirizine (ZYRTEC) 10 mg tablet Take  by mouth.  budesonide (RHINOCORT AQUA) 32 mcg/actuation nasal spray          Allergies:   No Known Allergies    Smoker:  Social History     Tobacco Use   Smoking Status Never Smoker   Smokeless Tobacco Never Used       ETOH:   Social History     Substance and Sexual Activity   Alcohol Use Yes    Comment: social       FH:   Family History   Problem Relation Age of Onset    Hypertension Mother     Cancer Mother        ROS:   As listed in HPI.  In addition:  Constitutional:   No headache, fever, fatigue, weight loss or weight gain      Cardiac:    No chest pain      Resp:   No cough or shortness of breath      Neuro   No loss of consciousness, dizziness, seizures      Physical Exam:  Height 5' 3\" (1.6 m), weight 238 lb (108 kg), last menstrual period 03/15/2020. GEN: No apparent distress. Alert and oriented and responds to all questions appropriately. NEUROLOGIC:  No focal neurologic deficits. Coordination and gait grossly intact. EXT: Well perfused. No edema. SKIN: No obvious rashes. Points to the epigastrium when describing the pain    Due to this being a TeleHealth evaluation, many elements of the physical examination are unable to be assessed. Assessment and Plan     Abdominal cramping, diarrhea  Intense pain. Symptom constellation suggestive of lower GI spasm. Try Bentyl for symptomatic relief but do expect this to improve with time  Try Metamucil to help out with loose stool and volume    Reflux  Currently taking famotidine with some relief. Add PPI and take these medicines daily for 2-4 weeks and try to wean off      ICD-10-CM ICD-9-CM    1. Abdominal cramping R10.9 789.00    2. Loose stools R19.5 787.7    3. Gastroesophageal reflux disease, esophagitis presence not specified K21.9 530.81          Pursuant to the emergency declaration under the [facility] waiver authority and the [employer] and [employer] Act, this Virtual  Visit was conducted, with patient's consent, to reduce the patient's risk of exposure to COVID-19 and provide continuity of care for an established patient. Services were provided through a video synchronous discussion virtually to substitute for in-person clinic visit.

## 2020-06-24 ENCOUNTER — VIRTUAL VISIT (OUTPATIENT)
Dept: FAMILY MEDICINE CLINIC | Age: 40
End: 2020-06-24

## 2020-06-24 DIAGNOSIS — S16.1XXA STRAIN OF NECK MUSCLE, INITIAL ENCOUNTER: Primary | ICD-10-CM

## 2020-06-24 RX ORDER — DICLOFENAC SODIUM 75 MG/1
75 TABLET, DELAYED RELEASE ORAL 2 TIMES DAILY
Qty: 60 TAB | Refills: 1 | Status: SHIPPED | OUTPATIENT
Start: 2020-06-24 | End: 2020-12-22 | Stop reason: ALTCHOICE

## 2020-06-24 RX ORDER — CYCLOBENZAPRINE HCL 10 MG
10 TABLET ORAL
Qty: 30 TAB | Refills: 1 | Status: SHIPPED | OUTPATIENT
Start: 2020-06-24 | End: 2020-12-22 | Stop reason: ALTCHOICE

## 2020-06-24 NOTE — PROGRESS NOTES
Chief Complaint   Patient presents with    Neck Pain     radiates to top of head     Pt was seen via [employer] video platform. Pt reports that she has been having neck pain that radiates to the back of her head. Pt reports that pain increases when she lays on her back, feels tender to put pressure on the back of her head. \"Tension in the back of my neck\"    Pt reports that pain comes and goes. No headaches, no recent injury or change or activity. Jemal Kerr is a 44 y.o. female who was seen by synchronous (real-time) audio-video technology on 6/24/2020. Consent: Jemal Kerr, who was seen by synchronous (real-time) audio-video technology, and/or her healthcare decision maker, is aware that this patient-initiated, Telehealth encounter on 6/24/2020 is a billable service, with coverage as determined by her insurance carrier. She is aware that she may receive a bill and has provided verbal consent to proceed: Yes. Assessment & Plan:   1. Strain of neck muscle, initial encounter  -take medication as written  -exercise provided through [employer]  -call back if no better in 4-5 days  -advised pt to review how she is sleeping, make sure she has proper neck support with current pillows   - diclofenac EC (VOLTAREN) 75 mg EC tablet; Take 1 Tab by mouth two (2) times a day. Dispense: 60 Tab; Refill: 1  - cyclobenzaprine (FLEXERIL) 10 mg tablet; Take 1 Tab by mouth nightly. Dispense: 30 Tab; Refill: 1      Subjective:   Jemal Kerr is a 44 y.o. female who was seen for Neck Pain (radiates to top of head)      Prior to Admission medications    Medication Sig Start Date End Date Taking? Authorizing Provider   diclofenac EC (VOLTAREN) 75 mg EC tablet Take 1 Tab by mouth two (2) times a day. 6/24/20  Yes Hector Crenshaw MD   cyclobenzaprine (FLEXERIL) 10 mg tablet Take 1 Tab by mouth nightly.  6/24/20  Yes Hector Crenshaw MD   omeprazole (PRILOSEC) 40 mg capsule 40 mg. 4/18/20  Yes Provider, Historical   cetirizine (ZYRTEC) 10 mg tablet Take  by mouth. Yes Provider, Historical   budesonide (RHINOCORT AQUA) 32 mcg/actuation nasal spray    Yes Provider, Historical   dicyclomine (BENTYL) 10 mg capsule Take 1 Cap by mouth three (3) times daily. 4/20/20   Aracelis Ramires MD     No Known Allergies    Patient Active Problem List   Diagnosis Code    Obesity, morbid ([facility] E66.01       Review of Systems   Constitutional: Negative for chills, fever and malaise/fatigue. HENT: Negative for congestion and sore throat. Respiratory: Negative for cough and shortness of breath. Cardiovascular: Negative for chest pain and palpitations. Gastrointestinal: Negative for abdominal pain, heartburn, nausea and vomiting. Genitourinary: Negative for dysuria and urgency. Musculoskeletal: Positive for myalgias and neck pain. Negative for joint pain. Neurological: Negative for dizziness, tingling and headaches. All other systems reviewed and are negative. Objective:   Vital Signs: (As obtained by patient/caregiver at home)  There were no vitals taken for this visit.      [INSTRUCTIONS:  \"[x]\" Indicates a positive item  \"[]\" Indicates a negative item  -- DELETE ALL ITEMS NOT EXAMINED]    Constitutional: [x] Appears well-developed and well-nourished [x] No apparent distress      [] Abnormal -     Mental status: [x] Alert and awake  [x] Oriented to person/place/time [x] Able to follow commands    [] Abnormal -     Eyes:   EOM    [x]  Normal    [] Abnormal -   Sclera  [x]  Normal    [] Abnormal -          Discharge [x]  None visible   [] Abnormal -     HENT: [x] Normocephalic, atraumatic  [] Abnormal -   [x] Mouth/Throat: Mucous membranes are moist    External Ears [x] Normal  [] Abnormal -    Neck: [x] No visualized mass [] Abnormal -     Pulmonary/Chest: [x] Respiratory effort normal   [x] No visualized signs of difficulty breathing or respiratory distress        [] Abnormal -      Musculoskeletal: [x] Normal gait with no signs of ataxia         [x] Normal range of motion of neck        [] Abnormal -     Neurological:        [x] No Facial Asymmetry (Cranial nerve 7 motor function) (limited exam due to video visit)          [x] No gaze palsy        [] Abnormal -          Skin:        [x] No significant exanthematous lesions or discoloration noted on facial skin         [] Abnormal -            Psychiatric:       [x] Normal Affect [] Abnormal -        [x] No Hallucinations    Other pertinent observable physical exam findings:-        We discussed the expected course, resolution and complications of the diagnosis(es) in detail. Medication risks, benefits, costs, interactions, and alternatives were discussed as indicated. I advised her to contact the office if her condition worsens, changes or fails to improve as anticipated. She expressed understanding with the diagnosis(es) and plan. Desiree Solares is a 44 y.o. female who was evaluated by a video visit encounter for concerns as above. Patient identification was verified prior to start of the visit. A caregiver was present when appropriate. Due to this being a TeleHealth encounter (During [facility] public health emergency), evaluation of the following organ systems was limited: Vitals/Constitutional/EENT/Resp/CV/GI//MS/Neuro/Skin/Heme-Lymph-Imm. Pursuant to the emergency declaration under the [facility] waiver authority and the [employer] and [employer] Act, this Virtual  Visit was conducted, with patient's (and/or legal guardian's) consent, to reduce the patient's risk of exposure to COVID-19 and provide necessary medical care. Services were provided through a video synchronous discussion virtually to substitute for in-person clinic visit. Patient and provider were located at their individual homes.       Eduardo Barros MD

## 2020-06-24 NOTE — PROGRESS NOTES
Chief Complaint   Patient presents with    Neck Pain     radiates to top of head     1. Have you been to the ER, urgent care clinic since your last visit? Hospitalized since your last visit? No    2. Have you seen or consulted any other health care providers outside of the [address] [place] [address] since your last visit? Include any pap smears or colon screening. No    Health maintenance reviewed. Pt informed of health maintenance past due and/or upcoming. Pt verbalized understanding.      Health Maintenance Due   Topic Date Due    DTaP/Tdap/Td series (1 - Tdap) 10/07/2001    PAP AKA CERVICAL CYTOLOGY  10/07/2001

## 2020-06-24 NOTE — PATIENT INSTRUCTIONS
Neck Strain or Sprain: Rehab Exercises Introduction Here are some examples of exercises for you to try. The exercises may be suggested for a condition or for rehabilitation. Start each exercise slowly. Ease off the exercises if you start to have pain. You will be told when to start these exercises and which ones will work best for you. How to do the exercises Neck rotation 1. Sit in a firm chair, or stand up straight. 2. Keeping your chin level, turn your head to the right, and hold for 15 to 30 seconds. 3. Turn your head to the left and hold for 15 to 30 seconds. 4. Repeat 2 to 4 times to each side. Neck stretches 1. Look straight ahead, and tip your right ear to your right shoulder. Do not let your left shoulder rise up as you tip your head to the right. 2. Hold for 15 to 30 seconds. 3. Tilt your head to the left. Do not let your right shoulder rise up as you tip your head to the left. 4. Hold for 15 to 30 seconds. 5. Repeat 2 to 4 times to each side. Forward neck flexion 1. Sit in a firm chair, or stand up straight. 2. Bend your head forward. 3. Hold for 15 to 30 seconds. 4. Repeat 2 to 4 times. Lateral (side) bend strengthening 1. With your right hand, place your first two fingers on your right temple. 2. Start to bend your head to the side while using gentle pressure from your fingers to keep your head from bending. 3. Hold for about 6 seconds. 4. Repeat 8 to 12 times. 5. Switch hands and repeat the same exercise on your left side. Forward bend strengthening 1. Place your first two fingers of either hand on your forehead. 2. Start to bend your head forward while using gentle pressure from your fingers to keep your head from bending. 3. Hold for about 6 seconds. 4. Repeat 8 to 12 times. Neutral position strengthening 1. Using one hand, place your fingertips on the back of your head at the top of your neck. 2. Start to bend your head backward while using gentle pressure from your fingers to keep your head from bending. 3. Hold for about 6 seconds. 4. Repeat 8 to 12 times. Chin tuck 1. Lie on the floor with a rolled-up towel under your neck. Your head should be touching the floor. 2. Slowly bring your chin toward your chest. 
3. Hold for a count of 6, and then relax for up to 10 seconds. 4. Repeat 8 to 12 times. Follow-up care is a key part of your treatment and safety. Be sure to make and go to all appointments, and call your doctor if you are having problems. It's also a good idea to know your test results and keep a list of the medicines you take. Where can you learn more? Go to http://alejandra-magali.info/ Enter M679 in the search box to learn more about \"Neck Strain or Sprain: Rehab Exercises. \" Current as of: March 2, 2020               Content Version: 12.5 © 2006-2020 Healthwise, Incorporated. Care instructions adapted under license by [employer] (which disclaims liability or warranty for this information). If you have questions about a medical condition or this instruction, always ask your healthcare professional. [address] any warranty or liability for your use of this information.

## 2020-07-29 ENCOUNTER — VIRTUAL VISIT (OUTPATIENT)
Dept: FAMILY MEDICINE CLINIC | Age: 40
End: 2020-07-29

## 2020-07-29 DIAGNOSIS — E66.01 OBESITY, MORBID (HCC): ICD-10-CM

## 2020-07-29 DIAGNOSIS — E05.90 HYPERTHYROIDISM: Primary | ICD-10-CM

## 2020-07-29 RX ORDER — IBUPROFEN 600 MG/1
600 TABLET ORAL AS NEEDED
COMMUNITY
Start: 2020-06-28

## 2020-07-29 NOTE — PROGRESS NOTES
1. Have you been to the ER, urgent care clinic since your last visit? Hospitalized since your last visit? Yes. Urgent Care    2. Have you seen or consulted any other health care providers outside of the [address] [place] [address] since your last visit? Include any pap smears or colon screening. No     Health Maintenance Due   Topic Date Due    DTaP/Tdap/Td series (1 - Tdap) 10/07/2001    PAP AKA CERVICAL CYTOLOGY  10/07/2001     Pap smear scheduled.

## 2020-07-29 NOTE — PROGRESS NOTES
Chief Complaint   Patient presents with    Gland Swelling     Pt was seen via do[employer]. me video visit. Pt reports that for the past week her throat has felt different, \"like something is there\". Pt reports that she has a fullness in her throat. Pt is not able to see any fullness on the outside, no choking. No cough or other URI symptoms. Pt has a history of hyperthyroidism, had radioactive iodine treatment. Brennan Hooper is a 44 y.o. female who was seen by synchronous (real-time) audio-video technology on 7/29/2020 for Gland Swelling        Assessment & Plan:   Diagnoses and all orders for this visit:    1. Hyperthyroidism  -check labs due to new symptoms  -     TSH 3RD GENERATION; Future  -     T3, FREE; Future  -     T4, FREE; Future    2. Obesity, morbid ([facility]  -check fasting labs  -     METABOLIC PANEL, COMPREHENSIVE; Future  -     CBC W/O DIFF; Future  -     LIPID PANEL; Future  -     HEMOGLOBIN A1C WITH EAG; Future  -     VITAMIN D, 25 HYDROXY; Future        Subjective:       Prior to Admission medications    Medication Sig Start Date End Date Taking? Authorizing Provider   ibuprofen (MOTRIN) 600 mg tablet Take 600 mg by mouth as needed. 6/28/20  Yes Provider, Historical   diclofenac EC (VOLTAREN) 75 mg EC tablet Take 1 Tab by mouth two (2) times a day. 6/24/20   Anitha Crenshaw MD   cyclobenzaprine (FLEXERIL) 10 mg tablet Take 1 Tab by mouth nightly. 6/24/20   Anitha Crenshaw MD   omeprazole (PRILOSEC) 40 mg capsule 40 mg. 4/18/20   Provider, Historical   dicyclomine (BENTYL) 10 mg capsule Take 1 Cap by mouth three (3) times daily. 4/20/20   Germaine Long MD   cetirizine (ZYRTEC) 10 mg tablet Take  by mouth.     Provider, Historical   budesonide (RHINOCORT AQUA) 32 mcg/actuation nasal spray     Provider, Historical     Patient Active Problem List   Diagnosis Code    Obesity, morbid ([facility] E66.01       Review of Systems   Constitutional: Negative for chills, fever and malaise/fatigue. HENT: Positive for sore throat. Negative for congestion. Respiratory: Negative for cough and shortness of breath. Cardiovascular: Negative for chest pain and palpitations. Gastrointestinal: Negative for abdominal pain, heartburn, nausea and vomiting. Genitourinary: Negative for dysuria and urgency. Musculoskeletal: Negative for joint pain and myalgias. Neurological: Negative for dizziness, tingling and headaches. All other systems reviewed and are negative. Objective:   No flowsheet data found.      [INSTRUCTIONS:  \"[x]\" Indicates a positive item  \"[]\" Indicates a negative item  -- DELETE ALL ITEMS NOT EXAMINED]    Constitutional: [x] Appears well-developed and well-nourished [x] No apparent distress      [] Abnormal -     Mental status: [x] Alert and awake  [x] Oriented to person/place/time [x] Able to follow commands    [] Abnormal -     Eyes:   EOM    [x]  Normal    [] Abnormal -   Sclera  [x]  Normal    [] Abnormal -          Discharge [x]  None visible   [] Abnormal -     HENT: [x] Normocephalic, atraumatic  [] Abnormal -   [x] Mouth/Throat: Mucous membranes are moist    External Ears [x] Normal  [] Abnormal -    Neck: [x] No visualized mass [] Abnormal -     Pulmonary/Chest: [x] Respiratory effort normal   [x] No visualized signs of difficulty breathing or respiratory distress        [] Abnormal -      Musculoskeletal:   [x] Normal gait with no signs of ataxia         [x] Normal range of motion of neck        [] Abnormal -     Neurological:        [x] No Facial Asymmetry (Cranial nerve 7 motor function) (limited exam due to video visit)          [x] No gaze palsy        [] Abnormal -          Skin:        [x] No significant exanthematous lesions or discoloration noted on facial skin         [] Abnormal -            Psychiatric:       [x] Normal Affect [] Abnormal -        [x] No Hallucinations    Other pertinent observable physical exam findings:-        We discussed the expected course, resolution and complications of the diagnosis(es) in detail. Medication risks, benefits, costs, interactions, and alternatives were discussed as indicated. I advised her to contact the office if her condition worsens, changes or fails to improve as anticipated. She expressed understanding with the diagnosis(es) and plan. Brennan Hooper, who was evaluated through a patient-initiated, synchronous (real-time) audio-video encounter, and/or her healthcare decision maker, is aware that it is a billable service, with coverage as determined by her insurance carrier. She provided verbal consent to proceed: Yes, and patient identification was verified. It was conducted pursuant to the emergency declaration under the [address] [place] [address] authority and the [employer] Act. A caregiver was present when appropriate. Ability to conduct physical exam was limited. I was at home. The patient was at home.       Yonatan Hernandez MD

## 2020-07-29 NOTE — PROGRESS NOTES
Faxed labs to [facility] per ordering physician's order. Confirmation received.

## 2020-07-31 LAB
25(OH)D3+25(OH)D2 SERPL-MCNC: 21.8 NG/ML (ref 30–100)
ALBUMIN SERPL-MCNC: 4 G/DL (ref 3.8–4.8)
ALBUMIN/GLOB SERPL: 1.2 {RATIO} (ref 1.2–2.2)
ALP SERPL-CCNC: 100 IU/L (ref 39–117)
ALT SERPL-CCNC: 15 IU/L (ref 0–32)
AST SERPL-CCNC: 15 IU/L (ref 0–40)
BILIRUB SERPL-MCNC: 0.3 MG/DL (ref 0–1.2)
BUN SERPL-MCNC: 13 MG/DL (ref 6–20)
BUN/CREAT SERPL: 17 (ref 9–23)
CALCIUM SERPL-MCNC: 9.4 MG/DL (ref 8.7–10.2)
CHLORIDE SERPL-SCNC: 103 MMOL/L (ref 96–106)
CHOLEST SERPL-MCNC: 125 MG/DL (ref 100–199)
CO2 SERPL-SCNC: 25 MMOL/L (ref 20–29)
CREAT SERPL-MCNC: 0.77 MG/DL (ref 0.57–1)
ERYTHROCYTE [DISTWIDTH] IN BLOOD BY AUTOMATED COUNT: 17.4 % (ref 11.7–15.4)
EST. AVERAGE GLUCOSE BLD GHB EST-MCNC: 108 MG/DL
GLOBULIN SER CALC-MCNC: 3.3 G/DL (ref 1.5–4.5)
GLUCOSE SERPL-MCNC: 102 MG/DL (ref 65–99)
HBA1C MFR BLD: 5.4 % (ref 4.8–5.6)
HCT VFR BLD AUTO: 33 % (ref 34–46.6)
HDLC SERPL-MCNC: 55 MG/DL
HGB BLD-MCNC: 11.1 G/DL (ref 11.1–15.9)
INTERPRETATION, 910389: NORMAL
LDLC SERPL CALC-MCNC: 52 MG/DL (ref 0–99)
MCH RBC QN AUTO: 23.3 PG (ref 26.6–33)
MCHC RBC AUTO-ENTMCNC: 33.6 G/DL (ref 31.5–35.7)
MCV RBC AUTO: 69 FL (ref 79–97)
PLATELET # BLD AUTO: 312 X10E3/UL (ref 150–450)
POTASSIUM SERPL-SCNC: 4.2 MMOL/L (ref 3.5–5.2)
PROT SERPL-MCNC: 7.3 G/DL (ref 6–8.5)
RBC # BLD AUTO: 4.77 X10E6/UL (ref 3.77–5.28)
SODIUM SERPL-SCNC: 142 MMOL/L (ref 134–144)
T3FREE SERPL-MCNC: 3.3 PG/ML (ref 2–4.4)
T4 FREE SERPL-MCNC: 0.79 NG/DL (ref 0.82–1.77)
TRIGL SERPL-MCNC: 90 MG/DL (ref 0–149)
TSH SERPL DL<=0.005 MIU/L-ACNC: 5.03 UIU/ML (ref 0.45–4.5)
VLDLC SERPL CALC-MCNC: 18 MG/DL (ref 5–40)
WBC # BLD AUTO: 11 X10E3/UL (ref 3.4–10.8)

## 2020-08-01 NOTE — PROGRESS NOTES
Vitamin D is slightly low, please take a daily supplement of at least 2000 IU (international units) daily. Thyroid is underactive, recommend starting on a thyroid supplement daily. Please advise if you agree. All other labs are within normal limits. A message has been sent in [employer] and the lab work released to the patient.

## 2020-08-27 ENCOUNTER — TELEPHONE (OUTPATIENT)
Dept: FAMILY MEDICINE CLINIC | Age: 40
End: 2020-08-27

## 2020-08-27 DIAGNOSIS — E03.9 ACQUIRED HYPOTHYROIDISM: Primary | ICD-10-CM

## 2020-08-27 RX ORDER — LEVOTHYROXINE SODIUM 50 UG/1
50 TABLET ORAL
Qty: 90 TAB | Refills: 3 | Status: SHIPPED | OUTPATIENT
Start: 2020-08-27 | End: 2021-11-23

## 2020-08-27 NOTE — TELEPHONE ENCOUNTER
Informed pt of lab results. Vitamin D is slightly low, please take a daily supplement of at least 2000 IU (international units) daily. Thyroid is underactive, recommend starting on a thyroid supplement daily. All other labs are within normal limits. Pt agrees to starting thyroid medication and would like it called in to [facility] in [place].

## 2020-12-09 ENCOUNTER — TELEPHONE (OUTPATIENT)
Dept: FAMILY MEDICINE CLINIC | Age: 40
End: 2020-12-09

## 2020-12-09 NOTE — TELEPHONE ENCOUNTER
Pt has given you permission to speak with her therapist in regards to care. You can contact Shivani Gill @ [phone] for update.

## 2020-12-22 ENCOUNTER — OFFICE VISIT (OUTPATIENT)
Dept: FAMILY MEDICINE CLINIC | Age: 40
End: 2020-12-22
Payer: MEDICAID

## 2020-12-22 VITALS
BODY MASS INDEX: 44.47 KG/M2 | RESPIRATION RATE: 17 BRPM | SYSTOLIC BLOOD PRESSURE: 115 MMHG | HEIGHT: 63 IN | OXYGEN SATURATION: 95 % | DIASTOLIC BLOOD PRESSURE: 76 MMHG | TEMPERATURE: 97.1 F | HEART RATE: 98 BPM | WEIGHT: 251 LBS

## 2020-12-22 DIAGNOSIS — E05.90 HYPERTHYROIDISM: Primary | ICD-10-CM

## 2020-12-22 DIAGNOSIS — E55.9 VITAMIN D DEFICIENCY: ICD-10-CM

## 2020-12-22 PROCEDURE — 99213 OFFICE O/P EST LOW 20 MIN: CPT | Performed by: FAMILY MEDICINE

## 2020-12-22 NOTE — PROGRESS NOTES
1. Have you been to the ER, urgent care clinic since your last visit? Hospitalized since your last visit?no    2. Have you seen or consulted any other health care providers outside of the [address] [place] [address] since your last visit? Include any pap smears or colon screening. no    Health Maintenance Due   Topic Date Due    DTaP/Tdap/Td series (1 - Tdap) 10/07/2001    PAP AKA CERVICAL CYTOLOGY  10/07/2001    Flu Vaccine (1) 09/01/2020     Chief Complaint   Patient presents with    Medication Evaluation     Pt is also experiencing (R) shoulder tension.

## 2020-12-22 NOTE — PROGRESS NOTES
Chief Complaint   Patient presents with    Medication Evaluation     Pt is here today to follow up on new start of thyroid medication. Pt has been taking medication daily, noticed some increase in energy. Pt has also been taking Vitamin d 2000IU daily. Subjective: (As above and below)     Chief Complaint   Patient presents with    Medication Evaluation     she is a 36y.o. year old female who presents for evaluation. Reviewed PmHx, RxHx, FmHx, SocHx, AllgHx and updated in chart. Review of Systems - negative except as listed above    Objective:     Vitals:    12/22/20 0757   BP: 115/76   Pulse: 98   Resp: 17   Temp: 97.1 °F (36.2 °C)   TempSrc: Temporal   SpO2: 95%   Weight: 251 lb (113.9 kg)   Height: 5' 3\" (1.6 m)     Physical Examination: General appearance - alert, well appearing, and in no distress  Mental status - normal mood, behavior, speech, dress, motor activity, and thought processes  Mouth - mucous membranes moist, pharynx normal without lesions  Chest - clear to auscultation, no wheezes, rales or rhonchi, symmetric air entry  Heart - normal rate, regular rhythm, normal S1, S2, no murmurs, rubs, clicks or gallops  Musculoskeletal - no joint tenderness, deformity or swelling  Extremities - peripheral pulses normal, no pedal edema, no clubbing or cyanosis    Assessment/ Plan:   1. Hyperthyroidism  -check labs  - TSH 3RD GENERATION    2. Vitamin D deficiency  - VITAMIN D, 25 HYDROXY       I have discussed the diagnosis with the patient and the intended plan as seen in the above orders. The patient has received an after-visit summary and questions were answered concerning future plans.      Medication Side Effects and Warnings were discussed with patient: yes  Patient Labs were reviewed: yes  Patient Past Records were reviewed:  yes    Dennise David M.D.

## 2020-12-23 ENCOUNTER — TELEPHONE (OUTPATIENT)
Dept: FAMILY MEDICINE CLINIC | Age: 40
End: 2020-12-23

## 2020-12-23 LAB
25(OH)D3+25(OH)D2 SERPL-MCNC: 32.3 NG/ML (ref 30–100)
TSH SERPL DL<=0.005 MIU/L-ACNC: 2.21 UIU/ML (ref 0.45–4.5)

## 2020-12-23 NOTE — TELEPHONE ENCOUNTER
Message from [employer]    Dr. Anita Leong, telephone  Received: Today  Message [facility], [address] [facility] Office Pool   Phone Number:  [phone] (Call me)             General Message/Vendor Calls     Caller's first and last name: n/a       Reason for call: Pt needs medication instructions. Callback required yes/no and why: Yes, only if information isn't able to be shared via [facility] contact number(s):  [phone]       Details to clarify the request: Pt had lab work done yesterday and results are in [address]  Pt needs to know based on these results if she is to continue taking levothyroxine and Vitamin D.  Pt would like this updated via [employer] if possible.        Thanks,   Xiomara Moran

## 2020-12-24 NOTE — PROGRESS NOTES
Thyroid is normalized, please continue on current medication. Vitamin D has improved, please continue on current supplement. A message has been sent in [employer] and the lab work released to the patient.

## 2021-12-06 ENCOUNTER — TELEPHONE (OUTPATIENT)
Dept: FAMILY MEDICINE CLINIC | Age: 41
End: 2021-12-06

## 2021-12-06 ENCOUNTER — NURSE TRIAGE (OUTPATIENT)
Dept: OTHER | Facility: CLINIC | Age: 41
End: 2021-12-06

## 2021-12-06 NOTE — TELEPHONE ENCOUNTER
Received call from Nayely Golden at [facility], caller not on line. Complaint: tingling in right leg and goes numb. Has been happening on and off and increase in frequency so she is now concerned. Market: Nadege Wiseman Name: [address] [place] [address] telephone number verified as [phone]    Connected with caller via phone, please see below triage      Received call from Nayely Golden at [facility] with Red Flag Complaint. Brief description of triage: tingling in right leg and goes numb. Has been happening on and off for 3 weeks and increase in frequency so she is now concerned. Triage indicates for patient to be seen today or go to [facility] [place] [place]/walk in clinic. Care advice provided, patient verbalizes understanding; denies any other questions or concerns; instructed to call back for any new or worsening symptoms. Sent information to scheduling team to call patient back, long wait to make an appointment. Attention Provider: Thank you for allowing me to participate in the care of your patient. The patient was connected to triage in response to information provided to the [facility]. Please do not respond through this encounter as the response is not directed to a shared pool. Reason for Disposition   Patient wants to be seen    Additional Information   Negative: Back pain (with neurologic deficit)     Denies back pain    Answer Assessment - Initial Assessment Questions  1. SYMPTOM: \"What is the main symptom you are concerned about? \" (e.g., weakness, numbness)     tingling in right leg and goes numb. Has been happening on and off and increase in frequency so she is now concerned. The longer she sits the more it becomes numb/tingling. 2. ONSET: \"When did this start? \" (minutes, hours, days; while sleeping)      3 weeks     3. LAST NORMAL: \"When was the last time you were normal (no symptoms)? \"      3 weeks     4. PATTERN \"Does this come and go, or has it been constant since it started? \"  \"Is it present now? \"      Comes and goes    5. CARDIAC SYMPTOMS: \"Have you had any of the following symptoms: chest pain, difficulty breathing, palpitations? \"      No     6. NEUROLOGIC SYMPTOMS: \"Have you had any of the following symptoms: headache, dizziness, vision loss, double vision, changes in speech, unsteady on your feet? \"      No     7. OTHER SYMPTOMS: \"Do you have any other symptoms? \"      Right arm gets sore at times, dizzy spells that comes and go. 8. PREGNANCY: \"Is there any chance you are pregnant? \" \"When was your last menstrual period? \"      No    Protocols used: NEUROLOGIC DEFICIT-ADULT-[place]    .

## 2021-12-06 NOTE — TELEPHONE ENCOUNTER
Lucho Villela RN contacted NICOLETTE Richmond    12/6/21 10:19 AM  Note  Received call from [address] at [facility], caller not on line.      Complaint: tingling in right leg and goes numb. Has been happening on and off and increase in frequency so she is now concerned.      Market: [address] [place] [address] Name: Debbie      Caller's telephone number verified as [phone]     Connected with caller via phone, please see below triage        Received call from [address] at [facility] with Red Flag Complaint.     Brief description of triage: tingling in right leg and goes numb. Has been happening on and off for 3 weeks and increase in frequency so she is now concerned.         Triage indicates for patient to be seen today or go to [facility] [place]/walk in clinic.      Care advice provided, patient verbalizes understanding; denies any other questions or concerns; instructed to call back for any new or worsening symptoms.     Sent information to scheduling team to call patient back, long wait to make an appointment.      Attention Provider: Thank you for allowing me to participate in the care of your patient. The patient was connected to triage in response to information provided to the [facility]. Please do not respond through this encounter as the response is not directed to a shared pool.     Reason for Disposition   Patient wants to be seen    Additional Information   Negative: Back pain (with neurologic deficit)     Denies back pain    Answer Assessment - Initial Assessment Questions  1. SYMPTOM: \"What is the main symptom you are concerned about? \" (e.g., weakness, numbness)     tingling in right leg and goes numb. Has been happening on and off and increase in frequency so she is now concerned. The longer she sits the more it becomes numb/tingling. 2. ONSET: \"When did this start? \" (minutes, hours, days; while sleeping)      3 weeks     3.  LAST NORMAL: \"When was the last time you were normal (no symptoms)? \"      3 weeks     4. PATTERN \"Does this come and go, or has it been constant since it started? \"  \"Is it present now? \"      Comes and goes    5. CARDIAC SYMPTOMS: \"Have you had any of the following symptoms: chest pain, difficulty breathing, palpitations? \"      No     6. NEUROLOGIC SYMPTOMS: \"Have you had any of the following symptoms: headache, dizziness, vision loss, double vision, changes in speech, unsteady on your feet? \"      No     7. OTHER SYMPTOMS: \"Do you have any other symptoms? \"      Right arm gets sore at times, dizzy spells that comes and go. 8. PREGNANCY: \"Is there any chance you are pregnant? \" \"When was your last menstrual period? \"      No    Protocols used: NEUROLOGIC DEFICIT-ADULT-[place]     .

## 2021-12-08 ENCOUNTER — TELEPHONE (OUTPATIENT)
Dept: FAMILY MEDICINE CLINIC | Age: 41
End: 2021-12-08

## 2021-12-08 NOTE — TELEPHONE ENCOUNTER
----- Message from Navin Elias sent at 12/8/2021  9:36 AM EST -----  Subject: Message to Provider    QUESTIONS  Information for Provider? Patient called in stating she was in the ED   Monday evening and they prescribed her gabapentin for neuropathy, patient   would like to know if she could take a B12 vitamin with the gabapentin. Please call the patient back. Thank You   ---------------------------------------------------------------------------  --------------  Danya PINEDA  What is the best way for the office to contact you? OK to leave message on   voicemail  Preferred Call Back Phone Number? [phone]  ---------------------------------------------------------------------------  --------------  SCRIPT ANSWERS  Relationship to Patient?  Self

## 2022-01-14 ENCOUNTER — OFFICE VISIT (OUTPATIENT)
Dept: FAMILY MEDICINE CLINIC | Age: 42
End: 2022-01-14
Payer: MEDICAID

## 2022-01-14 VITALS
SYSTOLIC BLOOD PRESSURE: 125 MMHG | BODY MASS INDEX: 44.58 KG/M2 | HEIGHT: 63 IN | WEIGHT: 251.6 LBS | TEMPERATURE: 98.4 F | DIASTOLIC BLOOD PRESSURE: 85 MMHG | HEART RATE: 98 BPM | OXYGEN SATURATION: 99 % | RESPIRATION RATE: 16 BRPM

## 2022-01-14 DIAGNOSIS — M25.562 ACUTE PAIN OF BOTH KNEES: Primary | ICD-10-CM

## 2022-01-14 DIAGNOSIS — M25.561 ACUTE PAIN OF BOTH KNEES: Primary | ICD-10-CM

## 2022-01-14 PROCEDURE — 99213 OFFICE O/P EST LOW 20 MIN: CPT | Performed by: FAMILY MEDICINE

## 2022-01-14 RX ORDER — GABAPENTIN 300 MG/1
300 CAPSULE ORAL AS NEEDED
COMMUNITY
Start: 2021-12-06

## 2022-01-14 NOTE — PROGRESS NOTES
1. Have you been to the ER, urgent care clinic since your last visit? Hospitalized since your last visit? Yes, [facility] [address] for leg numbness/pain. Possible Neuropathy. 2. Have you seen or consulted any other health care providers outside of the [address] [place] [address] since your last visit? Include any pap smears or colon screening.  No    Health Maintenance Due   Topic Date Due    Hepatitis C Screening  Never done    COVID-19 Vaccine (1) Never done    DTaP/Tdap/Td series (1 - Tdap) Never done    Cervical cancer screen  Never done    Flu Vaccine (1) 09/01/2021     Chief Complaint   Patient presents with   [facility] Follow Up

## 2022-01-14 NOTE — PROGRESS NOTES
SONY Mcclure is a 39 y.o. female who presents with bilateral leg discomforts. Evidently has had a mild plantar fasciitis of the right foot for about 6 months. This is problematic when she first gets up in the morning but after she gets moving it resolves and does not have any problems during the day. Mid November she started having an uncomfortable numbness and tingling feeling in the right distal hamstring. Points just above the popliteal space when describing this. Went to the emergency room for it and was told that she had neuropathy and given gabapentin. I suspect this diagnosis was based on the way she is describing this. She points to her entire leg when describing the problem but on more probing questions it is a very specific spot right behind her knee. It is only present when she is sitting. It gets better when she stands. Occasionally she will have an ache in this area that will keep her up at night. She has found Motrin, Aleve and gabapentin all helpful for this. She is only taken 5 gabapentin in the last month and a half. The ibuprofen is more helpful. She occasionally feels discomfort in the anterior knees. This is more of a problem when she has been sitting for a while. She has not noticed it while standing or walking and she has not paid enough attention to know whether it feels worse when she is resting after long walks    PMHx:  Past Medical History:   Diagnosis Date    GERD (gastroesophageal reflux disease)     occ    Morbid obesity ([address]     Thyroid disease     hyperthryoidism per patient       Meds:   Current Outpatient Medications   Medication Sig Dispense Refill    gabapentin (NEURONTIN) 300 mg capsule TAKE ONE CAPSULE BY MOUTH THREE TIMES DAILY FOR NEUROPATHY      levothyroxine (SYNTHROID) 50 mcg tablet TAKE 1 TABLET BY MOUTH EVERY DAY BEFORE BREAKFAST 90 Tablet 0    ibuprofen (MOTRIN) 600 mg tablet Take 600 mg by mouth as needed.       omeprazole (PRILOSEC) 40 mg capsule 40 mg.      cetirizine (ZYRTEC) 10 mg tablet Take  by mouth.  budesonide (RHINOCORT AQUA) 32 mcg/actuation nasal spray          Allergies:   No Known Allergies    Smoker:  Social History     Tobacco Use   Smoking Status Never Smoker   Smokeless Tobacco Never Used       ETOH:   Social History     Substance and Sexual Activity   Alcohol Use Yes    Comment: social       FH:   Family History   Problem Relation Age of Onset    Hypertension Mother     Cancer Mother        ROS:   As listed in HPI. In addition:  Constitutional:   No headache, fever, fatigue, weight loss or weight gain      Cardiac:    No chest pain      Resp:   No cough or shortness of breath      Neuro   No loss of consciousness, dizziness, seizures      Physical Exam:  Blood pressure 125/85, pulse 98, temperature 98.4 °F (36.9 °C), temperature source Oral, resp. rate 16, height 5' 3\" (1.6 m), weight 251 lb 9.6 oz (114.1 kg), last menstrual period 11/01/2021, SpO2 99 %. GEN: No apparent distress. Alert and oriented and responds to all questions appropriately. NEUROLOGIC:  No focal neurologic deficits. Strength and sensation grossly intact. Coordination and gait grossly intact. EXT: Well perfused. No edema. SKIN: No obvious rashes. Bilateral knees examined. There is no effusion no joint line tenderness no patellar grind. Stable joint. There is a prominence of the right popliteal space that could be a Baker's cyst.  Palpating the specific area is not reminding her of the discomfort that she gets in the popliteal space       Assessment and Plan     Bilateral knee pain  She is having a good day today so nothing is showing up on physical exam  Based on her description I suspect a mild joint inflammation and stadium knee  Recommended gentle exercises, physical therapy  Judicious use of NSAIDs  Weight loss    She has gabapentin does occasionally find it helpful. Is taken 5 pills in a month and a half.   Suggest she continue to try stretching this medicine and only use it if she finds it helpful but I do not believe this is a primary neuropathy      ICD-10-CM ICD-9-CM    1. Acute pain of both knees  M25.561 338.19     M25.562 719.46        AVS given.  Pt expressed understanding of instructions

## 2022-01-14 NOTE — PATIENT INSTRUCTIONS
Knee: Exercises  Introduction  Here are some examples of exercises for you to try. The exercises may be suggested for a condition or for rehabilitation. Start each exercise slowly. Ease off the exercises if you start to have pain. You will be told when to start these exercises and which ones will work best for you. How to do the exercises  Calf wall stretch    1. Stand facing a wall with your hands on the wall at about eye level. Put your affected leg about a step behind your other leg. 2. Keeping your back leg straight and your back heel on the floor, bend your front knee and gently bring your hip and chest toward the wall until you feel a stretch in the calf of your back leg. 3. Hold the stretch for at least 15 to 30 seconds. 4. Repeat 2 to 4 times. 5. Repeat steps 1 through 4, but this time keep your back knee bent. Hamstring wall stretch    1. Lie on your back in a doorway, with your good leg through the open door. 2. Slide your affected leg up the wall to straighten your knee. You should feel a gentle stretch down the back of your leg. 3. Hold the stretch for at least 1 minute. Then over time, try to lengthen the time you hold the stretch to as long as 6 minutes. 4. Repeat 2 to 4 times. 5. If you do not have a place to do this exercise in a doorway, there is another way to do it:  6. Lie on your back, and bend your affected leg. 7. Loop a towel under the ball and toes of that foot, and hold the ends of the towel in your hands. 8. Straighten your knee, and slowly pull back on the towel. You should feel a gentle stretch down the back of your leg. 9. Hold the stretch for at least 15 to 30 seconds. Or even better, hold the stretch for 1 minute if you can. 10. Repeat 2 to 4 times. 1. Do not arch your back. 2. Do not bend either knee. 3. Keep one heel touching the floor and the other heel touching the wall. Do not point your toes. Quad sets    1.  Sit with your leg straight and supported on the floor or a firm bed. (If you feel discomfort in the front or back of your knee, place a small towel roll under your knee.)  2. Tighten the muscles on top of your thigh by pressing the back of your knee flat down to the floor. (If you feel discomfort under your kneecap, place a small towel roll under your knee.)  3. Hold for about 6 seconds, then rest up to 10 seconds. 4. Do 8 to 12 repetitions several times a day. Straight-leg raises to the front    1. Lie on your back with your good knee bent so that your foot rests flat on the floor. Your injured leg should be straight. Make sure that your low back has a normal curve. You should be able to slip your flat hand in between the floor and the small of your back, with your palm touching the floor and your back touching the back of your hand. 2. Tighten the thigh muscles in the injured leg by pressing the back of your knee flat down to the floor. Hold your knee straight. 3. Keeping the thigh muscles tight, lift your injured leg up so that your heel is about 12 inches off the floor. Hold for 5 seconds and then lower slowly. 4. Do 8 to 12 repetitions. Straight-leg raises to the back    1. Lie on your stomach, and lift your leg straight up behind you (toward the ceiling). 2. Lift your toes about 6 inches off the floor, hold for about 6 seconds, then lower slowly. 3. Do 8 to 12 repetitions. Hamstring curls    1. Lie on your stomach with your knees straight. If your kneecap is uncomfortable, roll up a washcloth and put it under your leg just above your kneecap. 2. Lift the foot of your injured leg by bending the knee so that you bring the foot up toward your buttock. If this motion hurts, try it without bending your knee quite as far. This may help you avoid any painful motion. 3. Slowly lower your leg back to the floor. 4. Do 8 to 12 repetitions.   5. With permission from your doctor or physical therapist, you may also want to add a cuff weight to your ankle (not more than 5 pounds). With weight, you do not have to lift your leg more than 12 inches to get a hamstring workout. Wall slide with ball squeeze    1. Stand with your back against a wall and with your feet about shoulder-width apart. Your feet should be about 12 inches away from the wall. 2. Put a ball about the size of a soccer ball between your knees. Then slowly slide down the wall until your knees are bent about 20 to 30 degrees. 3. Tighten your thigh muscles by squeezing the ball between your knees. Hold that position for about 10 seconds, then stop squeezing. Rest for up to 10 seconds between repetitions. 4. Repeat 8 to 12 times. Heel raises    1. Stand with your feet a few inches apart, with your hands lightly resting on a counter or chair in front of you. 2. Slowly raise your heels off the floor while keeping your knees straight. 3. Hold for about 6 seconds, then slowly lower your heels to the floor. 4. Do 8 to 12 repetitions several times during the day. Heel dig bridging    Stop doing this exercise if it causes pain. 1. Lie on your back with both knees bent and your ankles bent so that only your heels are digging into the floor. Your knees should be bent about 90 degrees. 2. Then push your heels into the floor, squeeze your buttocks, and lift your hips off the floor until your shoulders, hips, and knees are all in a straight line. 3. Hold for about 6 seconds as you continue to breathe normally, and then slowly lower your hips back down to the floor and rest for up to 10 seconds. 4. Do 8 to 12 repetitions. Shallow standing knee bends    Do this exercise only if you have very little pain; if you have no clicking, locking, or giving way in the injured knee; and if it does not hurt while you are doing 8 to 12 repetitions. 1. Stand with your hands lightly resting on a counter or chair in front of you. Put your feet shoulder-width apart.   2. Slowly bend your knees so that you squat down like you are going to sit in a chair. Make sure your knees do not go in front of your toes. 3. Lower yourself about 6 inches. Your heels should remain on the floor at all times. 4. Rise slowly to a standing position. Follow-up care is a key part of your treatment and safety. Be sure to make and go to all appointments, and call your doctor if you are having problems. It's also a good idea to know your test results and keep a list of the medicines you take. Where can you learn more? Go to http://www.gray.com/  Enter C183 in the search box to learn more about \"Meniscus Tear: Exercises. \"  Current as of: July 1, 2021               Content Version: 13.0  © 2006-2021 [employer]. Care instructions adapted under license by [employer] (which disclaims liability or warranty for this information). If you have questions about a medical condition or this instruction, always ask your healthcare professional. [place] [address] any warranty or liability for your use of this information.

## 2022-01-31 ENCOUNTER — TELEPHONE (OUTPATIENT)
Dept: FAMILY MEDICINE CLINIC | Age: 42
End: 2022-01-31

## 2022-01-31 RX ORDER — BENZONATATE 200 MG/1
200 CAPSULE ORAL
Qty: 21 CAPSULE | Refills: 1 | Status: SHIPPED | OUTPATIENT
Start: 2022-01-31 | End: 2022-02-07

## 2022-01-31 NOTE — TELEPHONE ENCOUNTER
Tylenol, Motrin, Mucinex, honey and warm water for sore throat. She will likely develop a cough so I will call in some cough medicine. Covid is a virus. This will last 7-10 days.   She needs to quarantine until feeling better    Antibiotic will not be helpful

## 2022-01-31 NOTE — TELEPHONE ENCOUNTER
Pt states that she started experiencing a sore throat on Friday (01/28/2022); Early Saturday morning she started to experience body aches and chills; Pt states that she bought a at home covid test kit and it came back positive;  Pt is requesting an antibiotic sent to her pharmacy for this; please call pt back       Thank You

## 2022-02-08 ENCOUNTER — NURSE TRIAGE (OUTPATIENT)
Dept: OTHER | Facility: CLINIC | Age: 42
End: 2022-02-08

## 2022-02-08 ENCOUNTER — APPOINTMENT (OUTPATIENT)
Dept: GENERAL RADIOLOGY | Age: 42
End: 2022-02-08
Attending: NURSE PRACTITIONER
Payer: MEDICAID

## 2022-02-08 ENCOUNTER — HOSPITAL ENCOUNTER (EMERGENCY)
Age: 42
Discharge: HOME | End: 2022-02-08
Attending: EMERGENCY MEDICINE
Payer: MEDICAID

## 2022-02-08 VITALS
RESPIRATION RATE: 20 BRPM | TEMPERATURE: 98 F | WEIGHT: 250 LBS | OXYGEN SATURATION: 98 % | HEIGHT: 64 IN | HEART RATE: 88 BPM | DIASTOLIC BLOOD PRESSURE: 53 MMHG | SYSTOLIC BLOOD PRESSURE: 120 MMHG | BODY MASS INDEX: 42.68 KG/M2

## 2022-02-08 DIAGNOSIS — E87.6 HYPOKALEMIA: ICD-10-CM

## 2022-02-08 DIAGNOSIS — J20.9 ACUTE BRONCHITIS, UNSPECIFIED ORGANISM: ICD-10-CM

## 2022-02-08 DIAGNOSIS — U07.1 COVID-19: Primary | ICD-10-CM

## 2022-02-08 LAB
ALBUMIN SERPL-MCNC: 3.1 G/DL (ref 3.5–5)
ALBUMIN/GLOB SERPL: 0.7 {RATIO} (ref 1.1–2.2)
ALP SERPL-CCNC: 97 U/L (ref 45–117)
ALT SERPL-CCNC: 30 U/L (ref 12–78)
ANION GAP SERPL CALC-SCNC: 6 MMOL/L (ref 5–15)
AST SERPL-CCNC: 29 U/L (ref 15–37)
BILIRUB SERPL-MCNC: 0.5 MG/DL (ref 0.2–1)
BNP SERPL-MCNC: 22 PG/ML (ref 0–125)
BUN SERPL-MCNC: 10 MG/DL (ref 6–20)
BUN/CREAT SERPL: 10 (ref 12–20)
CALCIUM SERPL-MCNC: 8.1 MG/DL (ref 8.5–10.1)
CHLORIDE SERPL-SCNC: 105 MMOL/L (ref 97–108)
CO2 SERPL-SCNC: 27 MMOL/L (ref 21–32)
CREAT SERPL-MCNC: 0.97 MG/DL (ref 0.55–1.02)
GLOBULIN SER CALC-MCNC: 4.6 G/DL (ref 2–4)
GLUCOSE SERPL-MCNC: 108 MG/DL (ref 65–100)
POTASSIUM SERPL-SCNC: 3.3 MMOL/L (ref 3.5–5.1)
PROT SERPL-MCNC: 7.7 G/DL (ref 6.4–8.2)
SODIUM SERPL-SCNC: 138 MMOL/L (ref 136–145)
TROPONIN-HIGH SENSITIVITY: 4 NG/L (ref 0–51)

## 2022-02-08 PROCEDURE — 36415 COLL VENOUS BLD VENIPUNCTURE: CPT

## 2022-02-08 PROCEDURE — 83880 ASSAY OF NATRIURETIC PEPTIDE: CPT

## 2022-02-08 PROCEDURE — 71045 X-RAY EXAM CHEST 1 VIEW: CPT

## 2022-02-08 PROCEDURE — 99284 EMERGENCY DEPT VISIT MOD MDM: CPT

## 2022-02-08 PROCEDURE — 80053 COMPREHEN METABOLIC PANEL: CPT

## 2022-02-08 PROCEDURE — 84484 ASSAY OF TROPONIN QUANT: CPT

## 2022-02-08 PROCEDURE — 74011250637 HC RX REV CODE- 250/637: Performed by: NURSE PRACTITIONER

## 2022-02-08 PROCEDURE — 93005 ELECTROCARDIOGRAM TRACING: CPT

## 2022-02-08 PROCEDURE — 74011636637 HC RX REV CODE- 636/637: Performed by: NURSE PRACTITIONER

## 2022-02-08 RX ORDER — PREDNISONE 20 MG/1
60 TABLET ORAL DAILY
Qty: 15 TABLET | Refills: 0 | Status: SHIPPED | OUTPATIENT
Start: 2022-02-09 | End: 2022-02-14

## 2022-02-08 RX ORDER — POTASSIUM CHLORIDE 750 MG/1
40 TABLET, FILM COATED, EXTENDED RELEASE ORAL
Status: COMPLETED | OUTPATIENT
Start: 2022-02-08 | End: 2022-02-08

## 2022-02-08 RX ORDER — ALBUTEROL SULFATE 90 UG/1
8 AEROSOL, METERED RESPIRATORY (INHALATION) ONCE
Status: COMPLETED | OUTPATIENT
Start: 2022-02-08 | End: 2022-02-08

## 2022-02-08 RX ORDER — PREDNISONE 20 MG/1
60 TABLET ORAL ONCE
Status: COMPLETED | OUTPATIENT
Start: 2022-02-08 | End: 2022-02-08

## 2022-02-08 RX ADMIN — POTASSIUM CHLORIDE 40 MEQ: 750 TABLET, EXTENDED RELEASE ORAL at 19:23

## 2022-02-08 RX ADMIN — ALBUTEROL SULFATE 8 PUFF: 90 AEROSOL, METERED RESPIRATORY (INHALATION) at 17:29

## 2022-02-08 RX ADMIN — PREDNISONE 60 MG: 20 TABLET ORAL at 17:29

## 2022-02-08 NOTE — ED NOTES
Pt states that she tested positive for COVID on 1/28 and has developed a persistent cough that is productive for yellow and clear mucus. Pt states that the cough has worsened progressively. States she was unable to get in with her PCP and he recommended that she come to the ER for CXR. Pt is alert and oriented x 4, RR even and unlabored, skin is warm and dry. Assessment completed and pt updated on plan of care. Call bell in reach. Emergency Department Nursing Plan of Care       The Nursing Plan of Care is developed from the Nursing assessment and Emergency Department Attending provider initial evaluation. The plan of care may be reviewed in the ED Provider note.     The Plan of Care was developed with the following considerations:   Patient / Family readiness to learn indicated by:verbalized understanding  Persons(s) to be included in education: patient  Barriers to Learning/Limitations:No    Signed     Verito Ramirez RN    2/8/2022   11:45 AM

## 2022-02-08 NOTE — ED TRIAGE NOTES
Patient states she was diagnosed with COVID on 1/28, states \" I got better but starting 3 days ago I started to cough again, having back and chest pain, and some SOB.

## 2022-02-08 NOTE — Clinical Note
[facility] EMERGENCY DEPT  [address] [id number] [phone]    Work/School Note    Date: 2/8/2022    To Whom It May concern:    Jan Fall was seen and treated today in the emergency room by the following provider(s):  Attending Provider: Bandar Morgan DO  Nurse Practitioner: Jimena Irby NP. Jan Fall is excused from work/school on 2/8/2022 through 2/10/2022. She is medically clear to return to work/school on 2/11/2022.          Sincerely,          Shaun Nolen NP

## 2022-02-08 NOTE — ED PROVIDER NOTES
EMERGENCY DEPARTMENT HISTORY AND PHYSICAL EXAM      Date: 2022  Patient Name: Lucille Goldmann    History of Presenting Illness     Chief Complaint   Patient presents with    Cough       History Provided By: Patient    Additional History (Context): Lucille Goldmann is a 39 y.o. female with GERD, hyperthryoidism who presents with cough. Pt states she was diagnosed 22 [facility] COVID. Reports sx resolved but cough returned 3 days ago. Associated with pain with deep breathing. Constant dry cough. At times there is wheezing. Denies SOB, fever, chills, loss of taste or smell. Reports taking tessalon perles rx by PCP with no relief. No hx of bronchitis or Asthma. She is not a smoker. PCP: Shayne Devries MD    Current Outpatient Medications   Medication Sig Dispense Refill    [START ON 2022] predniSONE (DELTASONE) 20 mg tablet Take 60 mg by mouth daily for 5 days. With Breakfast 15 Tablet 0    gabapentin (NEURONTIN) 300 mg capsule TAKE ONE CAPSULE BY MOUTH THREE TIMES DAILY FOR NEUROPATHY      levothyroxine (SYNTHROID) 50 mcg tablet TAKE 1 TABLET BY MOUTH EVERY DAY BEFORE BREAKFAST 90 Tablet 0    ibuprofen (MOTRIN) 600 mg tablet Take 600 mg by mouth as needed.  omeprazole (PRILOSEC) 40 mg capsule 40 mg.      cetirizine (ZYRTEC) 10 mg tablet Take  by mouth.       budesonide (RHINOCORT AQUA) 32 mcg/actuation nasal spray          Past History     Past Medical History:  Past Medical History:   Diagnosis Date    GERD (gastroesophageal reflux disease)     occ    Morbid obesity ([address]     Thyroid disease     hyperthryoidism per patient       Past Surgical History:  Past Surgical History:   Procedure Laterality Date    HX CHOLECYSTECTOMY  2018    Polo Caldera MD    HX GYN      BTL    HX GYN             Family History:  Family History   Problem Relation Age of Onset    Hypertension Mother     Cancer Mother        Social History:  Social History     Tobacco Use    Smoking status: Never Smoker    Smokeless tobacco: Never Used   Substance Use Topics    Alcohol use: Yes     Comment: social    Drug use: No       Allergies:  No Known Allergies      Review of Systems   Review of Systems   Constitutional: Negative for appetite change, chills, fatigue and fever. HENT: Negative for congestion, ear pain, postnasal drip, rhinorrhea and sore throat. Eyes: Negative for pain and itching. Respiratory: Positive for cough and shortness of breath. Negative for chest tightness and wheezing. Cardiovascular: Positive for chest pain. Negative for palpitations and leg swelling. Gastrointestinal: Negative for abdominal pain, constipation, diarrhea, nausea and vomiting. Genitourinary: Negative for dysuria, frequency and urgency. Musculoskeletal: Positive for back pain. Negative for arthralgias, joint swelling and neck pain. Skin: Negative for color change and rash. Neurological: Negative for dizziness, weakness, numbness and headaches. All other systems reviewed and are negative. Physical Exam     Vitals:    02/08/22 1717 02/08/22 1811 02/08/22 1813 02/08/22 1832   BP:  (!) 101/52  (!) 120/53   Pulse:       Resp:       Temp:       SpO2: 98%  96% 98%   Weight:       Height:         Physical Exam  Vitals and nursing note reviewed. Constitutional:       General: She is not in acute distress. Appearance: She is well-developed. She is not ill-appearing. HENT:      Head: Normocephalic and atraumatic. Right Ear: Tympanic membrane and ear canal normal.      Left Ear: Tympanic membrane and ear canal normal.      Nose: Nose normal.      Mouth/Throat:      Mouth: Mucous membranes are moist.      Pharynx: Oropharynx is clear. No oropharyngeal exudate or posterior oropharyngeal erythema. Eyes:      Extraocular Movements: Extraocular movements intact. Conjunctiva/sclera: Conjunctivae normal.      Pupils: Pupils are equal, round, and reactive to light.    Cardiovascular:      Rate and Rhythm: Normal rate and regular rhythm. Pulses: Normal pulses. Heart sounds: Normal heart sounds. Pulmonary:      Effort: Pulmonary effort is normal.      Breath sounds: Normal breath sounds. Comments: Bronchospastic cough   Abdominal:      General: Bowel sounds are normal.      Tenderness: There is no abdominal tenderness. There is no guarding or rebound. Musculoskeletal:      Cervical back: Normal range of motion and neck supple. Skin:     General: Skin is warm and dry. Neurological:      Mental Status: She is alert and oriented to person, place, and time. Diagnostic Study Results     Labs -     Recent Results (from the past 12 hour(s))   METABOLIC PANEL, COMPREHENSIVE    Collection Time: 02/08/22  6:02 PM   Result Value Ref Range    Sodium 138 136 - 145 mmol/L    Potassium 3.3 (L) 3.5 - 5.1 mmol/L    Chloride 105 97 - 108 mmol/L    CO2 27 21 - 32 mmol/L    Anion gap 6 5 - 15 mmol/L    Glucose 108 (H) 65 - 100 mg/dL    BUN 10 6 - 20 MG/DL    Creatinine 0.97 0.55 - 1.02 MG/DL    BUN/Creatinine ratio 10 (L) 12 - 20      GFR est AA >60 >60 ml/min/1.73m2    GFR est non-AA >60 >60 ml/min/1.73m2    Calcium 8.1 (L) 8.5 - 10.1 MG/DL    Bilirubin, total 0.5 0.2 - 1.0 MG/DL    ALT (SGPT) 30 12 - 78 U/L    AST (SGOT) 29 15 - 37 U/L    Alk.  phosphatase 97 45 - 117 U/L    Protein, total 7.7 6.4 - 8.2 g/dL    Albumin 3.1 (L) 3.5 - 5.0 g/dL    Globulin 4.6 (H) 2.0 - 4.0 g/dL    A-G Ratio 0.7 (L) 1.1 - 2.2     NT-PRO BNP    Collection Time: 02/08/22  6:02 PM   Result Value Ref Range    NT pro-BNP 22 0 - 125 PG/ML   TROPONIN-HIGH SENSITIVITY    Collection Time: 02/08/22  6:02 PM   Result Value Ref Range    Troponin-High Sensitivity 4 0 - 51 ng/L       Radiologic Studies -   XR CHEST PORT   Final Result   Mild central congestion but no confluent infiltrate        CT Results  (Last 48 hours)    None        CXR Results  (Last 48 hours)               02/08/22 1723  XR CHEST PORT Final result Impression:  Mild central congestion but no confluent infiltrate       Narrative:  EXAM: XR CHEST PORT       INDICATION: cough ; COVID diagnosis 1/28/2022       COMPARISON: 5/26/2009       FINDINGS: A portable AP radiograph of the chest was obtained at 1716 hours. There is mild central congestion. . The cardiac and mediastinal contours are   stable. The bones and soft tissues are grossly within normal limits. Medical Decision Making   I am the first provider for this patient. I reviewed the vital signs, available nursing notes, past medical history, past surgical history, family history and social history. Vital Signs-Reviewed the patient's vital signs. Records Reviewed: Nursing Notes, Old Medical Records, Previous Radiology Studies and Previous Laboratory Studies      ED COURSE:   Initial assessment performed. The patients presenting problems have been discussed, and they are in agreement with the care plan formulated and outlined with them. I have encouraged them to ask questions as they arise throughout their visit. 38 yo F presenting for cough exhibiting bronchospastic cough on exam. No wheezing noted. Respirations are unlabored and show no signs of hypoxia. Plan to obtain CXR to r/o PNA. Due to recent COVID diagnosis, will administer ventolin inhaler vs duoneb and give prednisone. Differential diagnosis include PNA, pneumothorax, bronchitis, pleurisy, COVID 19, pleural effusion, MI        ED Course:   ED Course as of 02/08/22 2012 Tue Feb 08, 2022 1919 Progress Note:   CXR shows congestion. Negative for PNA. Plan to obtain labs to r/o cardiac etiology [NA]   1915         Progress Note:                      K+ 3.3, plan to supplement with potassum 40 mEq. Discharge with prednisone and advised to continue with venotilin inhaler. refer to PCP if no improvement in 3-5 days.     ED Course User Index  [NA] Shaun Nolen NP         Disposition:  Discharge   DISCHARGE NOTE: Pt has been reexamined. Patient has no new complaints, changes, or physical findings. Care plan outlined and precautions discussed. All of pt's questions and concerns were addressed. Patient was instructed and agrees to follow up with PCP, as well as to return to the ED upon further deterioration. Patient is ready to go home. Follow-up Information     Follow up With Specialties Details Why Contact Info    Kalina Quinones MD Family Medicine Call in 1 week As needed, If symptoms worsen [address] [place] [address] 7  [phone]            Discharge Medication List as of 2/8/2022  7:18 PM      START taking these medications    Details   predniSONE (DELTASONE) 20 mg tablet Take 60 mg by mouth daily for 5 days. With Breakfast, Normal, Disp-15 Tablet, R-0         CONTINUE these medications which have NOT CHANGED    Details   gabapentin (NEURONTIN) 300 mg capsule TAKE ONE CAPSULE BY MOUTH THREE TIMES DAILY FOR NEUROPATHY, Historical Med      levothyroxine (SYNTHROID) 50 mcg tablet TAKE 1 TABLET BY MOUTH EVERY DAY BEFORE BREAKFAST, Normal, Disp-90 Tablet, R-0      ibuprofen (MOTRIN) 600 mg tablet Take 600 mg by mouth as needed., Historical Med      omeprazole (PRILOSEC) 40 mg capsule 40 mg., Historical Med      cetirizine (ZYRTEC) 10 mg tablet Take  by mouth., Historical Med      budesonide (RHINOCORT AQUA) 32 mcg/actuation nasal spray Historical Med         STOP taking these medications       benzonatate (TESSALON) 200 mg capsule Comments:   Reason for Stopping:               Provider Notes (Medical Decision Making):     Procedures:  Procedures    Diagnosis     Clinical Impression:   1. COVID-19    2. Acute bronchitis, unspecified organism    3.  Hypokalemia

## 2022-02-08 NOTE — TELEPHONE ENCOUNTER
Received call from Onofre at [facility] with [employer]. Subjective: Caller states \"I was Covid positive and started having symptoms on the 28th of Jan\"     Current Symptoms: cough (pain with cough - chest hurts and mid back - this pain is on both sides of the chest), short of breath with cough - shortness with any activity, deep breaths causes a coughing fit, phlegm is yellow and thick, hears a wheeze on occasion    Onset: 11 days ago     Associated Symptoms: reduced activity    Pain Severity: 7/10; aching; intermittent    Temperature: No fever     What has been tried: taking flu and cold medicine, drinking hot tea    LMP: NA Pregnant: No    Recommended disposition: go to the office now or to an Urgent Care this afternoon    Care advice provided, patient verbalizes understanding; denies any other questions or concerns; instructed to call back for any new or worsening symptoms. Writer provided warm transfer to [place] at [facility] for appointment scheduling    Attention Provider: Thank you for allowing me to participate in the care of your patient. The patient was connected to triage in response to information provided to the [facility]. Please do not respond through this encounter as the response is not directed to a shared pool.       Reason for Disposition   MILD difficulty breathing (e.g., minimal/no SOB at rest, SOB with walking, pulse < 100) of new-onset or worse than normal    Protocols used: BREATHING DIFFICULTY-ADULT-OH

## 2022-02-09 ENCOUNTER — PATIENT OUTREACH (OUTPATIENT)
Dept: CASE MANAGEMENT | Age: 42
End: 2022-02-09

## 2022-02-09 LAB
ATRIAL RATE: 87 BPM
CALCULATED P AXIS, ECG09: 29 DEGREES
CALCULATED R AXIS, ECG10: 1 DEGREES
CALCULATED T AXIS, ECG11: 15 DEGREES
DIAGNOSIS, 93000: NORMAL
P-R INTERVAL, ECG05: 134 MS
Q-T INTERVAL, ECG07: 360 MS
QRS DURATION, ECG06: 74 MS
QTC CALCULATION (BEZET), ECG08: 433 MS
VENTRICULAR RATE, ECG03: 87 BPM

## 2022-02-09 NOTE — PROGRESS NOTES
Patient contacted regarding COVID-19 risk, exposure, diagnosis, pulse oximeter ordered at discharge, monoclonal antibody infusion follow up. Discussed COVID-19 related testing which was tested 22 at this time. Test results were positive. Patient informed of results, if available? Patient was advised 22 + covid. LPN Care Coordinator contacted the patient by telephone to perform post discharge assessment. Call within 2 business days of discharge: Yes Verified name and  with patient as identifiers. Provided introduction to self, and explanation of the CTN/ACM role, and reason for call due to risk factors for infection and/or exposure to COVID-19. Patient states she is doing much better today. Symptoms reviewed with patient who verbalized the following symptoms: fatigue and cough      Due to no new or worsening symptoms encounter was not routed to provider for escalation. Discussed follow-up appointments. If no appointment was previously scheduled, appointment scheduling offered:  Already scheduled. [facility] follow up appointment(s):   Future Appointments   Date Time Provider Nadege Givens   2/10/2022  8:20 Kei Oviedo MD [facility]     Non-[facility] follow up appointment(s): n/a    Interventions to address risk factors: Education of patient/family/caregiver/guardian to support self-management-[facility] and covid numbers given     Advance Care Planning:   Does patient have an Advance Directive: not on file. Educated patient about risk for severe COVID-19 due to risk factors according to CDC guidelines. LPN CC reviewed discharge instructions, medical action plan and red flag symptoms with the patient who verbalized understanding. Discussed COVID vaccination status: no. Education provided on COVID-19 vaccination as appropriate. Discussed exposure protocols and quarantine with CDC Guidelines.  Patient was given an opportunity to verbalize any questions and concerns and agrees to contact LPN CC or health care provider for questions related to their healthcare. Reviewed and educated patient on any new and changed medications related to discharge diagnosis     Was patient discharged with a pulse oximeter? no Discussed and confirmed pulse oximeter discharge instructions and when to notify provider or seek emergency care. LPN CC provided contact information. Plan for follow-up call in 5-7 days based on severity of symptoms and risk factors.

## 2022-02-09 NOTE — ED NOTES
Patient given copy of dc instructions and one script(s). Patient verbalized understanding of instructions and script (s). Patient given a current medication reconciliation form and verbalized understanding of their medications. Patient verbalized understanding of the importance of discussing medications with  his or her physician or clinic they will be following up with. Patient alert and oriented and in no acute distress. Patient discharged home ambulatory.

## 2022-02-16 ENCOUNTER — PATIENT OUTREACH (OUTPATIENT)
Dept: CASE MANAGEMENT | Age: 42
End: 2022-02-16

## 2022-02-16 NOTE — PROGRESS NOTES
Patient resolved from [address] Transitions episode on 2/16/22. Discussed COVID-19 related testing which was available at this time. Test results were positive. Patient informed of results, if available? Already advised     Patient/family has been provided the following resources and education related to COVID-19:                         Signs, symptoms and red flags related to COVID-19            [facility] exposure and quarantine guidelines            [employer] exposure contact - [phone]            Contact for their local Department of Health                 Patient currently reports that the following symptoms have improved:  cough. No further outreach scheduled with this CTN/ACM/LPN/HC/ MA. Episode of Care resolved. Patient has this CTN/ACM/LPN/HC/MA contact information if future needs arise.

## 2022-03-19 PROBLEM — E66.01 OBESITY, MORBID (HCC): Status: ACTIVE | Noted: 2018-07-12

## 2022-03-31 DIAGNOSIS — E03.9 ACQUIRED HYPOTHYROIDISM: ICD-10-CM

## 2022-03-31 RX ORDER — LEVOTHYROXINE SODIUM 50 UG/1
TABLET ORAL
Qty: 90 TABLET | Refills: 0 | Status: SHIPPED | OUTPATIENT
Start: 2022-03-31 | End: 2022-07-25

## 2022-07-23 DIAGNOSIS — E03.9 ACQUIRED HYPOTHYROIDISM: ICD-10-CM

## 2022-07-25 RX ORDER — LEVOTHYROXINE SODIUM 50 UG/1
TABLET ORAL
Qty: 90 TABLET | Refills: 3 | Status: SHIPPED | OUTPATIENT
Start: 2022-07-25

## 2022-07-26 ENCOUNTER — TELEPHONE (OUTPATIENT)
Dept: FAMILY MEDICINE CLINIC | Age: 42
End: 2022-07-26

## 2022-07-26 NOTE — TELEPHONE ENCOUNTER
----- Message from Kelvin Yoder sent at 7/26/2022  9:50 AM EDT -----  Subject: Message to Provider    QUESTIONS  Information for Provider? Pt called in to establish care with Dr Crenshaw. Pt is currently seeing Dr Reba Fernandes but would like to switch providers in   office. Pt stated she would like to start seeing a female provider. Please   advise.   ---------------------------------------------------------------------------  --------------  Sam WANG  [phone]; OK to leave message on voicemail  ---------------------------------------------------------------------------  --------------  SCRIPT ANSWERS  Relationship to Patient?  Self

## 2022-08-31 ENCOUNTER — OFFICE VISIT (OUTPATIENT)
Dept: NEUROLOGY | Age: 42
End: 2022-08-31
Payer: MEDICAID

## 2022-08-31 VITALS
TEMPERATURE: 97.6 F | BODY MASS INDEX: 41.93 KG/M2 | HEART RATE: 97 BPM | RESPIRATION RATE: 15 BRPM | OXYGEN SATURATION: 100 % | SYSTOLIC BLOOD PRESSURE: 110 MMHG | HEIGHT: 64 IN | WEIGHT: 245.6 LBS | DIASTOLIC BLOOD PRESSURE: 88 MMHG

## 2022-08-31 DIAGNOSIS — M25.561 CHRONIC PAIN OF BOTH KNEES: ICD-10-CM

## 2022-08-31 DIAGNOSIS — G89.29 CHRONIC PAIN OF BOTH KNEES: ICD-10-CM

## 2022-08-31 DIAGNOSIS — M25.562 CHRONIC PAIN OF BOTH KNEES: ICD-10-CM

## 2022-08-31 DIAGNOSIS — G57.11 MERALGIA PARESTHETICA OF RIGHT SIDE: Primary | ICD-10-CM

## 2022-08-31 DIAGNOSIS — E66.01 OBESITY, MORBID (HCC): ICD-10-CM

## 2022-08-31 PROBLEM — K21.9 GASTROESOPHAGEAL REFLUX DISEASE: Status: ACTIVE | Noted: 2022-08-31

## 2022-08-31 PROBLEM — B00.9 HERPES SIMPLEX VIRAL INFECTION: Status: ACTIVE | Noted: 2022-08-31

## 2022-08-31 PROCEDURE — 99205 OFFICE O/P NEW HI 60 MIN: CPT | Performed by: PSYCHIATRY & NEUROLOGY

## 2022-08-31 NOTE — PROGRESS NOTES
[address]  In Office NEW Pt VISIT         Néstor Velasquez is a 39 y.o. female who presents today for the following:  Chief Complaint   Patient presents with    New Patient     Rt leg numbness and tingling          ASSESSMENT AND PLAN    1. Meralgia paresthetica of right side  Assessment & Plan:   Discussed diagnosis with the patient and long-term implications  For now she is getting reasonable relief with use of gabapentin Motrin or Aleve as needed she is to continue with that presently    I have encouraged patient to work on weight loss with a healthy diet and anti-inflammatory diet along with modest exercise and continued stretching to include stretching of hip flexors [recommend yoga] and keeping a more active lifestyle    At this time I do not think she needs any diagnostic work-up I discussed this with the patient and she seemed comfortable with the plan at this time  2. Obesity, morbid ([address]  Assessment & Plan:  Patient is already lost 5 pounds by increasing her activity  We discussed anti-inflammatory diet as well as healthy eating and weight loss to help prevent chronic illness and long-term consequences    Patient was very open to discussion I gave her information regarding Mediterranean diet as well as healthy lifestyle information via the AVS    We discussed techniques for success in changing to a more healthy lifestyle including diet    We talked about realistic expectations of weight loss    And discussed ways to incorporate exercise to include stretching on a more regular basis  3. Chronic pain of both knees  Assessment & Plan:   Suspect osteoarthritis continue with Motrin or Aleve as appropriate  If worsens can consider x-rays but will defer to primary care for further management  Recommended weight loss and discussed appropriate weight loss tactics and healthy diets as well as exercise    Patient and/or family was given time to ask questions and voice concerns.  I believe all questions concerns were adequately addressed at this  office visit. Patient and/or family also verbalized agreement and understanding of the above-stated plan    Follow-up and Dispositions    Return in about 6 months (around 2/28/2023) for In office appointment. ICD-10-CM ICD-9-CM    1. Meralgia paresthetica of right side  G57.11 355.1       2. Obesity, morbid ([facility] [address]  E66.01 278.01       3. Chronic pain of both knees  M25.561 719.46     M25.562 338.29     G89.29              I attest that 60 minutes was spent on today's visit reviewing medical records and diagnostic testing deemed pertinent to this patient's care, along with direct time spent at patient's visit including the history, physical assessment and plan, discussing diagnosis and management along with documentation.       HPI    Patient was referred to the practice for the following reason[s]:  Numbness and tingling right leg    Patient is accompanied by:self  History is obtained predominantly by:self and medical records    Onset: Mid 2021   Location: Right side: Lateral aspect of her thigh sometimes radiating into her buttocks Quality: Numbness and tingling; sometimes burning  Treatment:   She had gone to the ED and was given gabapentin and told she had neuropathy  She is also been tried on Aleve and Motrin   Patient finds the use of gabapentin Aleve and/or Motrin beneficial and only uses it as needed  Activity is helpful  Stretching also helps  Triggers: Most noted when she is sitting down for prolonged periods of time  Associated symptoms: Can have an ache in her knees bilaterally and can wake her up at night     Neurological ROS: negative for - behavioral changes, bowel and bladder control changes, confusion, dizziness, gait disturbance, headaches, impaired coordination/balance, memory loss, seizures, speech problems, tremors, visual changes, weakness, or numbness or tingling in any of her other extremities          Other significant comorbid conditions/concerns  COVID-19 per patient report tested + 1/28/2022 with persistent cough ended up going to the ER 2/8/2022 and was treated for bronchitis  Hypothyroid  Morbid obesity        Pertinent diagnostic data      No results found for this or any previous visit. No Known Allergies    Current Outpatient Medications   Medication Sig    levothyroxine (SYNTHROID) 50 mcg tablet TAKE 1 TABLET BY MOUTH EVERY DAY BEFORE BREAKFAST    gabapentin (NEURONTIN) 300 mg capsule Take 300 mg by mouth as needed. ibuprofen (MOTRIN) 600 mg tablet Take 600 mg by mouth as needed. omeprazole (PRILOSEC) 40 mg capsule Take 40 mg by mouth as needed. cetirizine (ZYRTEC) 10 mg tablet Take  by mouth.    budesonide (RHINOCORT AQUA) 32 mcg/actuation nasal spray 2 Sprays by Both Nostrils route as needed. No current facility-administered medications for this visit. Past medical history/surgical history, family history, and social history have been reviewed for today's visit      ROS    A ten system review of constitutional, cardiovascular, respiratory, musculoskeletal, endocrine, skin, SHEENT, genitourinary, psychiatric and neurologic systems was obtained and is unremarkable except as mentioned under HPI          EXAMINATION:     Visit Vitals  /88 (BP 1 Location: Left upper arm, BP Patient Position: Sitting, BP Cuff Size: Large adult)   Pulse 97   Temp 97.6 °F (36.4 °C) (Temporal)   Resp 15   Ht 5' 4\" (1.626 m)   Wt 245 lb 9.6 oz (111.4 kg)   SpO2 100%   BMI 42.16 kg/m²         General appearance: Patient is well-developed and well-nourished in no apparent distress and well groomed.     Psych/mental health:  Affect: Appropriate    PHQ  3 most recent PHQ Screens 8/31/2022   Little interest or pleasure in doing things Not at all   Feeling down, depressed, irritable, or hopeless Not at all   Total Score PHQ 2 0       HEENT:   Normocephalic  With evidence of trauma: No  Full range of motion head neck: Yes  Tenderness to palpation of the head neck region: No      Cardiovascular:     Extremities warm to touch:Yes  Extremity swelling: No  Discoloration: No  Evidence of PVD: No    Respiratory:   Dyspnea on exertion: No   Abnormal effort on casual observation: No   Use of portable oxygen: No   Evidence of cyanosis: No     Musculoskeletal:   Evidence of significant bone deformities: No   Spinal curvature: No     Integumentary:    Obvious bruising: No   Lacerations or discoloration on casual observation: No       Neurological Examination:   Mental Status:        MMSE  No flowsheet data found. Formal testing was not completed    there was nothing concerning on general observation and discussion. Alert oriented and appropriate to general conversation  Normal processing on general observation  Followed conversation and responded seemingly appropriate throughout the office visit  No word finding difficulties noted on casual observation  Able to follow directions without difficulty     Cranial Nerves:    EOMs intact gaze is conjugate  No nystagmus is appreciated  Facial motor intact bilaterally  Hearing intact to conversation  Voice with normal projection, no evidence of secretion pooling  Shoulder shrug intact bilaterally  No tongue deviation appreciated     Motor:   Normal bulk  No tremor appreciated on today's exam  No abnormal movements appreciated on today's exam  Moves extremities spontaneously and with purpose  5/5 x 4      Sensation: Intact to light touch and vibration bilaterally at the great toes    Coordination/Cerebellar:   FTN: Intact bilaterally    Gait: Ambulates independently with normal gait and station    Reflexes: Symmetrical toes downgoing bilaterally    Fall risk assessment  Fall Risk Assessment, last 12 mths 7/29/2020   Able to walk? Yes   Fall in past 12 months?  No               Sole Ugarte MS, ANP-BC, [facility]

## 2022-08-31 NOTE — ASSESSMENT & PLAN NOTE
Patient is already lost 5 pounds by increasing her activity  We discussed anti-inflammatory diet as well as healthy eating and weight loss to help prevent chronic illness and long-term consequences    Patient was very open to discussion I gave her information regarding Mediterranean diet as well as healthy lifestyle information via the AVS    We discussed techniques for success in changing to a more healthy lifestyle including diet    We talked about realistic expectations of weight loss    And discussed ways to incorporate exercise to include stretching on a more regular basis

## 2022-08-31 NOTE — ASSESSMENT & PLAN NOTE
Suspect osteoarthritis continue with Motrin or Aleve as appropriate  If worsens can consider x-rays but will defer to primary care for further management  Recommended weight loss and discussed appropriate weight loss tactics and healthy diets as well as exercise

## 2022-08-31 NOTE — LETTER
8/31/2022    Patient: Michelle Rendon   YOB: 1980   Date of Visit: 8/31/2022     Mariano Kanner, NP  [address] [place] [address]  V[address] [facility] [address]    Dear Mariano Kanner, NP,      Thank you for referring Ms. Nader Figueroa to [address] [place] [address] for evaluation. My notes for this consultation are attached. If you have questions, please do not hesitate to call me. I look forward to following your patient along with you.       Sincerely,    Connie Carter NP

## 2022-08-31 NOTE — PATIENT INSTRUCTIONS
As per discussion    You have something called meralgia paresthetica which is an irritation of the superficial nerve on the outside of your leg    Things that help is of course to try to lose weight but do it smartly and do it slowly  We talked about the Mediterranean diet and I will give you some information on that but there is also really good information on the Internet in bookstores and magazines just make sure you check your source and make sure it is a reliable source    Make small changes in your diet at a time in order to ensure a higher likelihood of success to changing to a new lifestyle regarding your eating habits    In general the more farm to table the better off the food is in the more processed better off you stay away from it    Continue with your stretching exercises and I would urge you to incorporate some stretching yoga especially ones that are going to focus on loosening the hip flexors and often you can find that in yoga for cyclist because that is something that is focused on to open hip flexors for cyclist    Continue to use her gabapentin and Motrin as appropriate since her getting good response from that when you need it    For now we talked about doing a work-up but have decided to defer it and I think that is reasonable      Office Policies      Appointments  Please make sure that you arrive for your next appointment at least 15 minutes prior to your appointment time. If for some reason you are going to be late please notify the office to determine if you need to be rescheduled or we can adjust your appointment time      Phone calls/patient messages:  Please allow up to 24 hours for someone in the office to contact you about your call or message. Be mindful your provider may be out of the office or your message may require further review.  We encourage you to use [employer] for your messages as this is a faster, more efficient way to communicate with our office    Medication Refills:  Prescription medications require up to 48 business hours to process. We encourage you to use [employer] for your refills. For controlled medications: Please allow up to 72 business hours to process. Certain medications may require you to  a written prescription at our office. NO narcotic/controlled medications will be prescribed after 4pm Monday through Friday or on weekends    Form/Paperwork Completion:  We ask that you allow 7-14 business days. You may also download your forms to [employer] to have your doctor print off.

## 2022-08-31 NOTE — PROGRESS NOTES
1. Have you been to the ER, urgent care clinic since your last visit? Hospitalized since your last visit? Seen at [facility] in Feb. For leg numbness. 2. Have you seen or consulted any other health care providers outside of the [address] [place] [address] since your last visit? Include any pap smears or colon screening.    Seen by PCP        Chief Complaint   Patient presents with    New Patient     Rt leg numbness and tingling

## 2022-08-31 NOTE — ASSESSMENT & PLAN NOTE
Discussed diagnosis with the patient and long-term implications  For now she is getting reasonable relief with use of gabapentin Motrin or Aleve as needed she is to continue with that presently    I have encouraged patient to work on weight loss with a healthy diet and anti-inflammatory diet along with modest exercise and continued stretching to include stretching of hip flexors [recommend yoga] and keeping a more active lifestyle    At this time I do not think she needs any diagnostic work-up I discussed this with the patient and she seemed comfortable with the plan at this time

## 2023-09-20 DIAGNOSIS — E03.9 HYPOTHYROIDISM, UNSPECIFIED: ICD-10-CM

## 2023-09-20 RX ORDER — LEVOTHYROXINE SODIUM 0.05 MG/1
TABLET ORAL
Qty: 90 TABLET | Refills: 3 | Status: SHIPPED | OUTPATIENT
Start: 2023-09-20

## 2023-11-06 ENCOUNTER — OFFICE VISIT (OUTPATIENT)
Age: 43
End: 2023-11-06

## 2023-11-06 VITALS
DIASTOLIC BLOOD PRESSURE: 82 MMHG | OXYGEN SATURATION: 100 % | BODY MASS INDEX: 40.34 KG/M2 | HEART RATE: 84 BPM | SYSTOLIC BLOOD PRESSURE: 133 MMHG | RESPIRATION RATE: 18 BRPM | WEIGHT: 235 LBS | TEMPERATURE: 98.2 F

## 2023-11-06 DIAGNOSIS — R30.0 DYSURIA: Primary | ICD-10-CM

## 2023-11-06 DIAGNOSIS — N89.8 VAGINAL IRRITATION: ICD-10-CM

## 2023-11-06 LAB
BILIRUBIN, URINE, POC: NEGATIVE
BLOOD URINE, POC: NEGATIVE
GLUCOSE URINE, POC: NEGATIVE
HCG, PREGNANCY, URINE, POC: NEGATIVE
KETONES, URINE, POC: NEGATIVE
LEUKOCYTE ESTERASE, URINE, POC: NEGATIVE
NITRITE, URINE, POC: NEGATIVE
PH, URINE, POC: 5.5 (ref 4.6–8)
PROTEIN,URINE, POC: NEGATIVE
SPECIFIC GRAVITY, URINE, POC: 1.01 (ref 1–1.03)
URINALYSIS CLARITY, POC: NORMAL
URINALYSIS COLOR, POC: YELLOW
UROBILINOGEN, POC: NORMAL
VALID INTERNAL CONTROL, POC: NORMAL

## 2023-11-06 NOTE — PROGRESS NOTES
distress. Appearance: Normal appearance. She is obese. She is not ill-appearing or toxic-appearing. HENT:      Head: Normocephalic and atraumatic. Genitourinary:     Comments: Exam deferred  Skin:     General: Skin is warm and dry. Findings: No rash. Neurological:      Mental Status: She is alert. Results for orders placed or performed in visit on 11/06/23   AMB POC URINALYSIS DIP STICK AUTO W/ MICRO   Result Value Ref Range    Color (UA POC) Yellow     Clarity (UA POC) Cloudy     Glucose, Urine, POC Negative Negative    Bilirubin, Urine, POC Negative Negative    Ketones, Urine, POC Negative Negative    Specific Gravity, Urine, POC 1.015 1.001 - 1.035    Blood (UA POC) Negative Negative    pH, Urine, POC 5.5 4.6 - 8.0    Protein, Urine, POC Negative Negative    Urobilinogen, POC 0.2 mg/dL     Nitrite, Urine, POC Negative Negative    Leukocyte Esterase, Urine, POC Negative Negative   AMB POC URINE PREGNANCY TEST, VISUAL COLOR COMPARISON   Result Value Ref Range    Valid Internal Control, POC Pass     HCG, Pregnancy, Urine, POC Negative Negative            An electronic signature was used to authenticate this note.     LINDA Noe - NP

## 2023-11-08 LAB
BACTERIA SPEC CULT: NORMAL
SERVICE CMNT-IMP: NORMAL

## 2023-11-10 LAB
A VAGINAE DNA VAG QL NAA+PROBE: NORMAL SCORE
BVAB2 DNA VAG QL NAA+PROBE: NORMAL SCORE
C ALBICANS DNA VAG QL NAA+PROBE: NEGATIVE
C GLABRATA DNA VAG QL NAA+PROBE: NEGATIVE
C TRACH RRNA SPEC QL NAA+PROBE: NEGATIVE
HSV1 DNA SPEC QL NAA+PROBE: NEGATIVE
HSV2 DNA SPEC QL NAA+PROBE: NEGATIVE
MEGA1 DNA VAG QL NAA+PROBE: NORMAL SCORE
N GONORRHOEA RRNA SPEC QL NAA+PROBE: NEGATIVE
SPECIMEN SOURCE: NORMAL
T VAGINALIS RRNA SPEC QL NAA+PROBE: NEGATIVE

## 2024-04-18 ENCOUNTER — TELEPHONE (OUTPATIENT)
Age: 44
End: 2024-04-18

## 2024-04-18 RX ORDER — FLUCONAZOLE 150 MG/1
150 TABLET ORAL ONCE
Qty: 1 TABLET | Refills: 0 | Status: SHIPPED | OUTPATIENT
Start: 2024-04-18 | End: 2024-04-18

## 2024-04-18 NOTE — TELEPHONE ENCOUNTER
Spoke with pt and she has appt with Dr Chiang on Monday the 22nd. She states she has a yeast infection and the OTC monistat hasn't worked as she still has the itching and burning. Pt states she has frequent yeast infections. Last one being about 6 month ago.

## 2024-04-18 NOTE — TELEPHONE ENCOUNTER
----- Message from Jennmary Alvarez sent at 4/18/2024  1:14 PM EDT -----  Subject: Message to Provider    QUESTIONS  Information for Provider? Patient use to see Katy Flores. She has a new   patient appt scheduled for 4/22/2024 & she believes that she has a yeast   infection & would like something prescribed to help. Pharmacy? [facility] [facility].   ---------------------------------------------------------------------------  --------------  CALL BACK INFO  [phone]; OK to leave message on voicemail  ---------------------------------------------------------------------------  --------------  SCRIPT ANSWERS  Relationship to Patient? Self

## 2024-07-07 ENCOUNTER — OFFICE VISIT (OUTPATIENT)
Age: 44
End: 2024-07-07

## 2024-07-07 VITALS
SYSTOLIC BLOOD PRESSURE: 129 MMHG | OXYGEN SATURATION: 100 % | HEART RATE: 87 BPM | WEIGHT: 214 LBS | TEMPERATURE: 98.3 F | BODY MASS INDEX: 36.73 KG/M2 | DIASTOLIC BLOOD PRESSURE: 87 MMHG

## 2024-07-07 DIAGNOSIS — N89.8 VAGINAL IRRITATION: Primary | ICD-10-CM

## 2024-07-07 DIAGNOSIS — B37.31 CANDIDIASIS OF FEMALE GENITALIA: ICD-10-CM

## 2024-07-07 LAB
BILIRUBIN, URINE, POC: NEGATIVE
BLOOD URINE, POC: NEGATIVE
GLUCOSE URINE, POC: NEGATIVE
KETONES, URINE, POC: NEGATIVE
LEUKOCYTE ESTERASE, URINE, POC: NORMAL
NITRITE, URINE, POC: NEGATIVE
PH, URINE, POC: 5.5 (ref 4.6–8)
PROTEIN,URINE, POC: NEGATIVE
SPECIFIC GRAVITY, URINE, POC: 1.02 (ref 1–1.03)
URINALYSIS CLARITY, POC: NORMAL
URINALYSIS COLOR, POC: YELLOW
UROBILINOGEN, POC: NORMAL

## 2024-07-07 RX ORDER — MEDROXYPROGESTERONE ACETATE 10 MG/1
TABLET ORAL
COMMUNITY
Start: 2024-05-07

## 2024-07-07 RX ORDER — FLUCONAZOLE 150 MG/1
150 TABLET ORAL
Qty: 4 TABLET | Refills: 0 | Status: SHIPPED | OUTPATIENT
Start: 2024-07-07 | End: 2024-07-19

## 2024-11-04 ENCOUNTER — OFFICE VISIT (OUTPATIENT)
Age: 44
End: 2024-11-04
Payer: MEDICAID

## 2024-11-04 VITALS
DIASTOLIC BLOOD PRESSURE: 82 MMHG | HEIGHT: 64 IN | OXYGEN SATURATION: 97 % | TEMPERATURE: 98.7 F | HEART RATE: 70 BPM | SYSTOLIC BLOOD PRESSURE: 126 MMHG | BODY MASS INDEX: 38.28 KG/M2 | WEIGHT: 224.2 LBS | RESPIRATION RATE: 18 BRPM

## 2024-11-04 DIAGNOSIS — Z13.1 ENCOUNTER FOR SCREENING FOR DIABETES MELLITUS: ICD-10-CM

## 2024-11-04 DIAGNOSIS — Z11.59 NEED FOR HEPATITIS C SCREENING TEST: ICD-10-CM

## 2024-11-04 DIAGNOSIS — Z11.4 SCREENING FOR HIV WITHOUT PRESENCE OF RISK FACTORS: ICD-10-CM

## 2024-11-04 DIAGNOSIS — E03.9 HYPOTHYROIDISM, UNSPECIFIED TYPE: Primary | ICD-10-CM

## 2024-11-04 DIAGNOSIS — Z12.31 ENCOUNTER FOR SCREENING MAMMOGRAM FOR BREAST CANCER: ICD-10-CM

## 2024-11-04 DIAGNOSIS — Z76.89 ENCOUNTER TO ESTABLISH CARE WITH NEW DOCTOR: ICD-10-CM

## 2024-11-04 PROCEDURE — 99214 OFFICE O/P EST MOD 30 MIN: CPT | Performed by: NURSE PRACTITIONER

## 2024-11-04 RX ORDER — MULTIVIT-MIN/IRON/FOLIC ACID/K 18-600-40
2000 CAPSULE ORAL DAILY
COMMUNITY

## 2024-11-04 SDOH — ECONOMIC STABILITY: FOOD INSECURITY: WITHIN THE PAST 12 MONTHS, THE FOOD YOU BOUGHT JUST DIDN'T LAST AND YOU DIDN'T HAVE MONEY TO GET MORE.: NEVER TRUE

## 2024-11-04 SDOH — ECONOMIC STABILITY: FOOD INSECURITY: WITHIN THE PAST 12 MONTHS, YOU WORRIED THAT YOUR FOOD WOULD RUN OUT BEFORE YOU GOT MONEY TO BUY MORE.: NEVER TRUE

## 2024-11-04 SDOH — ECONOMIC STABILITY: INCOME INSECURITY: HOW HARD IS IT FOR YOU TO PAY FOR THE VERY BASICS LIKE FOOD, HOUSING, MEDICAL CARE, AND HEATING?: NOT HARD AT ALL

## 2024-11-04 ASSESSMENT — PATIENT HEALTH QUESTIONNAIRE - PHQ9
SUM OF ALL RESPONSES TO PHQ QUESTIONS 1-9: 0
SUM OF ALL RESPONSES TO PHQ QUESTIONS 1-9: 0
SUM OF ALL RESPONSES TO PHQ9 QUESTIONS 1 & 2: 0
2. FEELING DOWN, DEPRESSED OR HOPELESS: NOT AT ALL
SUM OF ALL RESPONSES TO PHQ QUESTIONS 1-9: 0
SUM OF ALL RESPONSES TO PHQ QUESTIONS 1-9: 0
1. LITTLE INTEREST OR PLEASURE IN DOING THINGS: NOT AT ALL

## 2024-11-04 ASSESSMENT — ENCOUNTER SYMPTOMS
SHORTNESS OF BREATH: 0
CONSTIPATION: 1
CHEST TIGHTNESS: 0
ABDOMINAL PAIN: 0

## 2024-11-04 NOTE — PROGRESS NOTES
Chief Complaint   Patient presents with    Follow-up     New to provider           Health Maintenance Due   Topic Date Due    Depression Screen  Never done    Varicella vaccine (1 of 2 - 13+ 2-dose series) Never done    HIV screen  Never done    Hepatitis C screen  Never done    Hepatitis B vaccine (1 of 3 - 19+ 3-dose series) Never done    DTaP/Tdap/Td vaccine (1 - Tdap) Never done    Cervical cancer screen  Never done    Breast cancer screen  Never done    Flu vaccine (1) 08/01/2024    COVID-19 Vaccine (1 - 2023-24 season) Never done         \"Have you been to the ER, urgent care clinic since your last visit?  Hospitalized since your last visit?\"    NO    “Have you seen or consulted any other health care providers outside of [facility] since your last visit?”    NO       Have you had a mammogram?”   NO    No breast cancer screening on file      “Have you had a pap smear?”    NO    No cervical cancer screening on file         
medication compliance in approximately 8 weeks  Prescription sent in for the Synthroid-reviewed patient's labs noting would need to restart Synthroid  Orders:    Lipid Panel; Future    CBC with Auto Differential; Future    Comprehensive Metabolic Panel; Future    TSH; Future    T4, Free; Future    Hemoglobin A1C; Future    Hemoglobin A1C    T4, Free    TSH    Comprehensive Metabolic Panel    CBC with Auto Differential    Lipid Panel  Orders:    levothyroxine (SYNTHROID) 50 MCG tablet; Take 1 tablet by mouth every morning (before breakfast)  Encounter to establish care with new doctor   reviewed available medical records in the chart     Encounter for screening for diabetes mellitus       Orders:    Glucose, Fasting [[id number]]; Future    Hemoglobin A1C; Future    Hemoglobin A1C    Glucose, Fasting [[id number]]    Screening for HIV without presence of risk factors       Orders:    HIV 1/2 Ag/Ab, 4TH Generation,W Rflx Confirm; Future    HIV 1/2 Ag/Ab, 4TH Generation,W Rflx Confirm    Need for hepatitis C screening test       Orders:    Hepatitis C Antibody; Future    Hepatitis C Antibody    Encounter for screening mammogram for breast cancer       Orders:    [facility] YINA DIGITAL SCREEN BILATERAL [[id number]]; Future               Results for orders placed or performed in visit on 11/04/24   Hemoglobin A1C   Result Value Ref Range    Hemoglobin A1C 5.1 4.0 - 5.6 %    Estimated Avg Glucose 100 mg/dL   T4, Free   Result Value Ref Range    T4 Free 0.8 0.8 - 1.5 NG/DL   TSH   Result Value Ref Range    TSH, 3rd Generation 4.11 (H) 0.36 - 3.74 uIU/mL   Comprehensive Metabolic Panel   Result Value Ref Range    Sodium 142 136 - 145 mmol/L    Potassium 4.3 3.5 - 5.1 mmol/L    Chloride 109 (H) 97 - 108 mmol/L    CO2 28 21 - 32 mmol/L    Anion Gap 5 2 - 12 mmol/L    Glucose 93 65 - 100 mg/dL    BUN 13 6 - 20 MG/DL    Creatinine 0.85 0.55 - 1.02 MG/DL    BUN/Creatinine Ratio 15 12 - 20      Est, Glom Filt Rate 87 >60 ml/min/1.73m2    Calcium

## 2024-11-04 NOTE — ASSESSMENT & PLAN NOTE
Uncontrolled  Will hold off on sending Synthroid until we have labs back so that we are appropriately dosing the patient  Patient to return to the clinic for follow-up lab work and medication compliance in approximately 8 weeks  Prescription sent in for the Synthroid-reviewed patient's labs noting would need to restart Synthroid  Orders:    Lipid Panel; Future    CBC with Auto Differential; Future    Comprehensive Metabolic Panel; Future    TSH; Future    T4, Free; Future    Hemoglobin A1C; Future    Hemoglobin A1C    T4, Free    TSH    Comprehensive Metabolic Panel    CBC with Auto Differential    Lipid Panel  Orders:    levothyroxine (SYNTHROID) 50 MCG tablet; Take 1 tablet by mouth every morning (before breakfast)

## 2024-11-05 LAB
ALBUMIN SERPL-MCNC: 3.2 G/DL (ref 3.5–5)
ALBUMIN/GLOB SERPL: 0.8 (ref 1.1–2.2)
ALP SERPL-CCNC: 92 U/L (ref 45–117)
ALT SERPL-CCNC: 14 U/L (ref 12–78)
ANION GAP SERPL CALC-SCNC: 5 MMOL/L (ref 2–12)
AST SERPL-CCNC: 8 U/L (ref 15–37)
BASOPHILS # BLD: 0.1 K/UL (ref 0–0.1)
BASOPHILS NFR BLD: 1 % (ref 0–1)
BILIRUB SERPL-MCNC: 0.5 MG/DL (ref 0.2–1)
BUN SERPL-MCNC: 13 MG/DL (ref 6–20)
BUN/CREAT SERPL: 15 (ref 12–20)
CALCIUM SERPL-MCNC: 9.1 MG/DL (ref 8.5–10.1)
CHLORIDE SERPL-SCNC: 109 MMOL/L (ref 97–108)
CHOLEST SERPL-MCNC: 130 MG/DL
CO2 SERPL-SCNC: 28 MMOL/L (ref 21–32)
CREAT SERPL-MCNC: 0.85 MG/DL (ref 0.55–1.02)
DIFFERENTIAL METHOD BLD: ABNORMAL
EOSINOPHIL # BLD: 0.1 K/UL (ref 0–0.4)
EOSINOPHIL NFR BLD: 1 % (ref 0–7)
ERYTHROCYTE [DISTWIDTH] IN BLOOD BY AUTOMATED COUNT: 17.6 % (ref 11.5–14.5)
EST. AVERAGE GLUCOSE BLD GHB EST-MCNC: 100 MG/DL
GLOBULIN SER CALC-MCNC: 4 G/DL (ref 2–4)
GLUCOSE P FAST SERPL-MCNC: 93 MG/DL (ref 65–100)
GLUCOSE SERPL-MCNC: 93 MG/DL (ref 65–100)
HBA1C MFR BLD: 5.1 % (ref 4–5.6)
HCT VFR BLD AUTO: 34.2 % (ref 35–47)
HCV AB SER IA-ACNC: <0.02 INDEX
HCV AB SERPL QL IA: NONREACTIVE
HDLC SERPL-MCNC: 64 MG/DL
HDLC SERPL: 2 (ref 0–5)
HGB BLD-MCNC: 11.1 G/DL (ref 11.5–16)
HIV 1+2 AB+HIV1 P24 AG SERPL QL IA: NONREACTIVE
HIV 1/2 RESULT COMMENT: NORMAL
IMM GRANULOCYTES # BLD AUTO: 0.1 K/UL (ref 0–0.04)
IMM GRANULOCYTES NFR BLD AUTO: 1 % (ref 0–0.5)
LDLC SERPL CALC-MCNC: 54.8 MG/DL (ref 0–100)
LYMPHOCYTES # BLD: 2.2 K/UL (ref 0.8–3.5)
LYMPHOCYTES NFR BLD: 24 % (ref 12–49)
MCH RBC QN AUTO: 24.6 PG (ref 26–34)
MCHC RBC AUTO-ENTMCNC: 32.5 G/DL (ref 30–36.5)
MCV RBC AUTO: 75.8 FL (ref 80–99)
MONOCYTES # BLD: 0.8 K/UL (ref 0–1)
MONOCYTES NFR BLD: 8 % (ref 5–13)
NEUTS SEG # BLD: 6.1 K/UL (ref 1.8–8)
NEUTS SEG NFR BLD: 65 % (ref 32–75)
NRBC # BLD: 0 K/UL (ref 0–0.01)
NRBC BLD-RTO: 0 PER 100 WBC
PLATELET # BLD AUTO: 272 K/UL (ref 150–400)
PMV BLD AUTO: 12.3 FL (ref 8.9–12.9)
POTASSIUM SERPL-SCNC: 4.3 MMOL/L (ref 3.5–5.1)
PROT SERPL-MCNC: 7.2 G/DL (ref 6.4–8.2)
RBC # BLD AUTO: 4.51 M/UL (ref 3.8–5.2)
SODIUM SERPL-SCNC: 142 MMOL/L (ref 136–145)
T4 FREE SERPL-MCNC: 0.8 NG/DL (ref 0.8–1.5)
TRIGL SERPL-MCNC: 56 MG/DL
TSH SERPL DL<=0.05 MIU/L-ACNC: 4.11 UIU/ML (ref 0.36–3.74)
VLDLC SERPL CALC-MCNC: 11.2 MG/DL
WBC # BLD AUTO: 9.3 K/UL (ref 3.6–11)

## 2024-11-05 RX ORDER — LEVOTHYROXINE SODIUM 50 UG/1
50 TABLET ORAL
Qty: 90 TABLET | Refills: 0 | Status: SHIPPED | OUTPATIENT
Start: 2024-11-05

## 2025-04-04 ENCOUNTER — TELEMEDICINE (OUTPATIENT)
Age: 45
End: 2025-04-04

## 2025-04-04 DIAGNOSIS — E03.9 HYPOTHYROIDISM, UNSPECIFIED TYPE: Primary | ICD-10-CM

## 2025-04-04 DIAGNOSIS — J30.2 SEASONAL ALLERGIES: ICD-10-CM

## 2025-04-04 PROCEDURE — 99214 OFFICE O/P EST MOD 30 MIN: CPT | Performed by: NURSE PRACTITIONER

## 2025-04-04 RX ORDER — FLUTICASONE PROPIONATE 50 MCG
2 SPRAY, SUSPENSION (ML) NASAL DAILY
Qty: 16 G | Refills: 1 | Status: SHIPPED | OUTPATIENT
Start: 2025-04-04

## 2025-04-04 RX ORDER — LEVOTHYROXINE SODIUM 50 UG/1
50 TABLET ORAL
Qty: 90 TABLET | Refills: 0 | Status: SHIPPED | OUTPATIENT
Start: 2025-04-04

## 2025-04-04 RX ORDER — CETIRIZINE HYDROCHLORIDE 10 MG/1
10 TABLET ORAL DAILY
Qty: 30 TABLET | Refills: 0 | Status: SHIPPED | OUTPATIENT
Start: 2025-04-04 | End: 2025-05-04

## 2025-04-04 SDOH — ECONOMIC STABILITY: FOOD INSECURITY: WITHIN THE PAST 12 MONTHS, YOU WORRIED THAT YOUR FOOD WOULD RUN OUT BEFORE YOU GOT MONEY TO BUY MORE.: NEVER TRUE

## 2025-04-04 SDOH — ECONOMIC STABILITY: FOOD INSECURITY: WITHIN THE PAST 12 MONTHS, THE FOOD YOU BOUGHT JUST DIDN'T LAST AND YOU DIDN'T HAVE MONEY TO GET MORE.: NEVER TRUE

## 2025-04-04 ASSESSMENT — ENCOUNTER SYMPTOMS
NAUSEA: 0
ABDOMINAL PAIN: 0
SHORTNESS OF BREATH: 0
VOMITING: 0
SINUS PRESSURE: 1

## 2025-04-04 ASSESSMENT — PATIENT HEALTH QUESTIONNAIRE - PHQ9
1. LITTLE INTEREST OR PLEASURE IN DOING THINGS: NOT AT ALL
SUM OF ALL RESPONSES TO PHQ QUESTIONS 1-9: 0
2. FEELING DOWN, DEPRESSED OR HOPELESS: NOT AT ALL
SUM OF ALL RESPONSES TO PHQ QUESTIONS 1-9: 0

## 2025-04-04 NOTE — ASSESSMENT & PLAN NOTE
Orders:    levothyroxine (SYNTHROID) 50 MCG tablet; Take 1 tablet by mouth every morning (before breakfast)    TSH; Future

## 2025-04-04 NOTE — PROGRESS NOTES
Jose Shah, was evaluated through a synchronous (real-time) audio-video encounter. The patient (or guardian if applicable) is aware that this is a billable service, which includes applicable co-pays. This Virtual Visit was conducted with patient's (and/or legal guardian's) consent. Patient identification was verified, and a caregiver was present when appropriate.   The patient was located at Home: [address] [place] [id number]  Provider was located at Facility (Appt Dept): [facility], [address] [address]  Confirm you are appropriately licensed, registered, or certified to deliver care in the state where the patient is located as indicated above. If you are not or unsure, please re-schedule the visit: Yes, I confirm.     Jose Shah (:  1980) is a Established patient, presenting virtually for evaluation of the following:      Below is the assessment and plan developed based on review of pertinent history, physical exam, labs, studies, and medications.     Assessment & Plan  Hypothyroidism, unspecified type       Orders:    levothyroxine (SYNTHROID) 50 MCG tablet; Take 1 tablet by mouth every morning (before breakfast)    TSH; Future    Seasonal allergies       Orders:    cetirizine (ZYRTEC) 10 MG tablet; Take 1 tablet by mouth daily    fluticasone (FLONASE) 50 MCG/ACT nasal spray; 2 sprays by Each Nostril route daily      Return in about 6 months (around 10/4/2025).       Subjective   Medication Refill  Associated symptoms include congestion and headaches. Pertinent negatives include no abdominal pain, chest pain, fever, nausea, rash or vomiting.       Hypothyroidism  Currently off medication Synthroid 50 mg daily  Patient feels much better now that she is on her medication we will refill it today  Placed lab for TSH    Patient is also dealing with seasonal allergies as there is a ton of pollen right now.  She endorses headache and nasal congestion.  Prescription for

## 2025-04-04 NOTE — PROGRESS NOTES
The patient identity was confirmed with  and First/Last Name. Medications and Allergies reviewed with patient, as well as any new diagnosis/procedures. Depression Screening and SDOH Screening done today.    Chief Complaint   Patient presents with    Medication Refill      Patient has link, will hop on at visit time.     There were no vitals filed for this visit.    Health Maintenance Due   Topic Date Due    Varicella vaccine (1 of 2 - 13+ 2-dose series) Never done    Hepatitis B vaccine (1 of 3 - 19+ 3-dose series) Never done    DTaP/Tdap/Td vaccine (1 - Tdap) Never done    Breast cancer screen  Never done    COVID-19 Vaccine ( season) Never done          \"Have you been to the ER, urgent care clinic since your last visit?  Hospitalized since your last visit?\"    NO    “Have you seen or consulted any other health care providers outside our system since your last visit?”    NO    Have you had a mammogram?”   NO    No breast cancer screening on file

## 2025-04-14 DIAGNOSIS — J30.2 SEASONAL ALLERGIES: ICD-10-CM

## 2025-04-14 RX ORDER — CETIRIZINE HYDROCHLORIDE 10 MG/1
10 TABLET ORAL DAILY
Qty: 90 TABLET | Refills: 1 | Status: SHIPPED | OUTPATIENT
Start: 2025-04-14

## 2025-04-14 RX ORDER — FLUTICASONE PROPIONATE 50 MCG
2 SPRAY, SUSPENSION (ML) NASAL DAILY
Qty: 48 ML | Refills: 1 | Status: SHIPPED | OUTPATIENT
Start: 2025-04-14

## 2025-04-25 ENCOUNTER — APPOINTMENT (OUTPATIENT)
Facility: HOSPITAL | Age: 45
End: 2025-04-25

## 2025-04-25 ENCOUNTER — HOSPITAL ENCOUNTER (EMERGENCY)
Facility: HOSPITAL | Age: 45
Discharge: HOME | End: 2025-04-25

## 2025-04-25 VITALS
HEART RATE: 95 BPM | BODY MASS INDEX: 38.62 KG/M2 | OXYGEN SATURATION: 98 % | RESPIRATION RATE: 16 BRPM | TEMPERATURE: 98.1 F | SYSTOLIC BLOOD PRESSURE: 113 MMHG | WEIGHT: 225 LBS | DIASTOLIC BLOOD PRESSURE: 81 MMHG

## 2025-04-25 DIAGNOSIS — S29.019A THORACIC MYOFASCIAL STRAIN, INITIAL ENCOUNTER: Primary | ICD-10-CM

## 2025-04-25 DIAGNOSIS — M62.830 SPASM OF THORACIC BACK MUSCLE: ICD-10-CM

## 2025-04-25 LAB
APPEARANCE UR: CLEAR
BACTERIA URNS QL MICRO: ABNORMAL /HPF
BILIRUB UR QL: NEGATIVE
COLOR UR: ABNORMAL
EPITH CASTS URNS QL MICRO: ABNORMAL /LPF
GLUCOSE UR STRIP.AUTO-MCNC: NEGATIVE MG/DL
HGB UR QL STRIP: NEGATIVE
HYALINE CASTS URNS QL MICRO: ABNORMAL /LPF (ref 0–2)
KETONES UR QL STRIP.AUTO: NEGATIVE MG/DL
LEUKOCYTE ESTERASE UR QL STRIP.AUTO: NEGATIVE
NITRITE UR QL STRIP.AUTO: NEGATIVE
PH UR STRIP: 5.5 (ref 5–8)
PROT UR STRIP-MCNC: NEGATIVE MG/DL
RBC #/AREA URNS HPF: ABNORMAL /HPF (ref 0–5)
SP GR UR REFRACTOMETRY: 1.01
URINE CULTURE IF INDICATED: ABNORMAL
UROBILINOGEN UR QL STRIP.AUTO: 0.2 EU/DL (ref 0.2–1)
WBC URNS QL MICRO: ABNORMAL /HPF (ref 0–4)

## 2025-04-25 PROCEDURE — 74176 CT ABD & PELVIS W/O CONTRAST: CPT

## 2025-04-25 PROCEDURE — 99284 EMERGENCY DEPT VISIT MOD MDM: CPT

## 2025-04-25 PROCEDURE — 81001 URINALYSIS AUTO W/SCOPE: CPT

## 2025-04-25 ASSESSMENT — PAIN DESCRIPTION - LOCATION: LOCATION: BACK

## 2025-04-25 ASSESSMENT — PAIN DESCRIPTION - ORIENTATION: ORIENTATION: RIGHT;LOWER

## 2025-04-25 ASSESSMENT — PAIN SCALES - GENERAL: PAINLEVEL_OUTOF10: 7

## 2025-04-25 ASSESSMENT — PAIN DESCRIPTION - DESCRIPTORS: DESCRIPTORS: ACHING

## 2025-04-25 ASSESSMENT — PAIN - FUNCTIONAL ASSESSMENT: PAIN_FUNCTIONAL_ASSESSMENT: 0-10

## 2025-04-25 ASSESSMENT — LIFESTYLE VARIABLES
HOW MANY STANDARD DRINKS CONTAINING ALCOHOL DO YOU HAVE ON A TYPICAL DAY: PATIENT DOES NOT DRINK
HOW OFTEN DO YOU HAVE A DRINK CONTAINING ALCOHOL: NEVER

## 2025-04-25 NOTE — ED PROVIDER NOTES
[facility] EMERGENCY DEPARTMENT  EMERGENCY DEPARTMENT ENCOUNTER       Pt Name: Jose Shah  MRN: [id number]  Birthdate 1980  Date of Evaluation: 2025  Provider: Gregory D Symenow, PA-C   PCP: Katelyn Deng APRN - CNP  Note Started: 8:04 PM 25     CHIEF COMPLAINT       Chief Complaint   Patient presents with    Back Pain     Patient ambulatory to triage c/o R-sided lower back pain that is intermittent x1 month. Patient denies any urinary symptoms. Denies radiation of pain, denies injury/trauma.         HISTORY OF PRESENT ILLNESS: 1 or more elements      History From: Patient  None     Jose Shah is a 44 y.o. female who presents to the ED today with complaints of worsening right lower lateral thoracic back pain.  Patient states that it has been coming and going for the last month and is worse when she bends over or lifts something heavy, but notes that it feels different the last few days and she noticed some what she calls debris in her urine and is concerned that she had a kidney stone.     Nursing Notes were all reviewed and agreed with or any disagreements were addressed in the HPI.     REVIEW OF SYSTEMS      Review of Systems     Positives and Pertinent negatives as per HPI.    PAST HISTORY     Past Medical History:  Past Medical History:   Diagnosis Date    GERD (gastroesophageal reflux disease)     occ    Morbid obesity (HCC)     Thyroid disease     hyperthryoidism per patient    Uterine fibroid        Past Surgical History:  Past Surgical History:   Procedure Laterality Date    CHOLECYSTECTOMY  2018    Angélica Montero MD    GYN          GYN      BTL       Family History:  Family History   Problem Relation Age of Onset    Cancer Mother     Hypertension Mother        Social History:  Social History     Tobacco Use    Smoking status: Never     Passive exposure: Never    Smokeless tobacco: Never   Vaping Use    Vaping status: Never Used   Substance Use Topics    Alcohol  None    Social Determinants affecting Dx or Tx: None    Records Reviewed (source and summary of external records): Nursing Notes    MDM: CC/HPI Summary, DDx, ED Course, and Reassessment. Disposition Considerations (Tests not done, Shared Decision Making, Pt Expectation of Test or Tx.):   44-year-old female with a history of GERD, morbid obesity, thyroid disease, and uterine fibroids presented with right-sided lower thoracic back pain that has been intermittent over the past month, now worsening and associated with concern for possible kidney stone after noting \"debris\" in her urine. Vitals were stable with a heart rate of 95 and a blood pressure of 113/81. On exam, she had tenderness to the right inferior posterolateral thoracic region without CVA tenderness. Urinalysis was reassuring, showing clear urine without hematuria, pyuria, or signs of infection. CT abdomen and pelvis without contrast showed no renal or ureteral calculi and a normal appendix, though there was subtle ascending colonic wall thickening likely inflammatory rather than neoplastic. Differential diagnosis included thoracic myofascial strain versus referred pain from gastrointestinal irritation; renal colic was considered but ruled out by imaging and urinalysis. No H&P findings concerning for infection or systemic illness were identified. No specialty consultation was required. Patient was discharged in stable condition with instructions to manage musculoskeletal pain with rest, heat, and NSAIDs, and to follow up with her primary care provider in two days. Clear return precautions were discussed.           FINAL IMPRESSION     1. Thoracic myofascial strain, initial encounter    2. Spasm of thoracic back muscle          DISPOSITION/PLAN   DISPOSITION Decision To Discharge 04/25/2025 04:52:33 PM   DISPOSITION CONDITION Stable           Discharge Note: The patient is stable for discharge home. The signs, symptoms, diagnosis, and discharge instructions

## 2025-07-18 ENCOUNTER — OFFICE VISIT (OUTPATIENT)
Age: 45
End: 2025-07-18

## 2025-07-18 VITALS
HEART RATE: 80 BPM | HEIGHT: 64 IN | DIASTOLIC BLOOD PRESSURE: 75 MMHG | OXYGEN SATURATION: 99 % | RESPIRATION RATE: 16 BRPM | WEIGHT: 222 LBS | TEMPERATURE: 98.8 F | BODY MASS INDEX: 37.9 KG/M2 | SYSTOLIC BLOOD PRESSURE: 127 MMHG

## 2025-07-18 DIAGNOSIS — E03.9 HYPOTHYROIDISM, UNSPECIFIED TYPE: Primary | Chronic | ICD-10-CM

## 2025-07-18 PROCEDURE — 99214 OFFICE O/P EST MOD 30 MIN: CPT | Performed by: NURSE PRACTITIONER

## 2025-07-18 ASSESSMENT — ENCOUNTER SYMPTOMS
SHORTNESS OF BREATH: 0
VOMITING: 0
ABDOMINAL PAIN: 0
NAUSEA: 0

## 2025-07-18 NOTE — PROGRESS NOTES
Jose Shah (:  1980) is a 44 y.o. female,Established patient, here for evaluation of the following chief complaint(s):         1. Hypothyroidism, unspecified type  Comments:  uncontrolled  labs pending today  hold off on until Monday  Orders:  -     TSH; Future       No follow-ups on file.       Subjective     HPI    Endorses in perimenopause per gyn   Needs mammogram-patient is aware    In regards to her hypothyroidism we will be getting labs today continue Synthroid at same dose I will reach out on Monday with those lab results if any adjustments are needed    Overall patient looks good feels good she can follow-up in January and from that point forward we will move to probably every 12 months unless there is a change in her TSH levels        Vitals:    25 0918   BP: 127/75   BP Site: Right Upper Arm   Patient Position: Sitting   BP Cuff Size: Large Adult   Pulse: 80   Resp: 16   Temp: 98.8 °F (37.1 °C)   TempSrc: Temporal   SpO2: 99%   Weight: 100.7 kg (222 lb)   Height: 1.626 m (5' 4\")        Past Medical History:   Diagnosis Date    GERD (gastroesophageal reflux disease)     occ    Morbid obesity (HCC)     Thyroid disease     hyperthryoidism per patient    Uterine fibroid        Past Surgical History:   Procedure Laterality Date    CHOLECYSTECTOMY  2018    Angélica Montero MD    GYN          GYN      BTL       Current Outpatient Medications   Medication Sig Dispense Refill    cetirizine (ZYRTEC) 10 MG tablet TAKE 1 TABLET BY MOUTH EVERY DAY 90 tablet 1    fluticasone (FLONASE) 50 MCG/ACT nasal spray SPRAY 2 SPRAYS INTO EACH NOSTRIL EVERY DAY 48 mL 1    levothyroxine (SYNTHROID) 50 MCG tablet Take 1 tablet by mouth every morning (before breakfast) 90 tablet 0    vitamin D 50 MCG (2000) CAPS capsule Take 1 capsule by mouth daily      budesonide (RINOCORT AQUA) 32 MCG/ACT nasal spray 2 sprays by Nasal route as needed       No current facility-administered medications for this

## 2025-07-18 NOTE — PROGRESS NOTES
Chief Complaint   Patient presents with    3 Month Follow-Up         Health Maintenance Due   Topic Date Due    Varicella vaccine (1 of 2 - 13+ 2-dose series) Never done    Hepatitis B vaccine (1 of 3 - 19+ 3-dose series) Never done    DTaP/Tdap/Td vaccine (1 - Tdap) Never done    Breast cancer screen  Never done    COVID-19 Vaccine (1 - 2024-25 season) Never done         \"Have you been to the ER, urgent care clinic since your last visit?  Hospitalized since your last visit?\"      ER on 04/25/25 back pain  “Have you seen or consulted any other health care providers outside of [facility] since your last visit?”    GYN.        Have you had a mammogram?”   NO    No breast cancer screening on file

## 2025-07-19 LAB — TSH SERPL DL<=0.05 MIU/L-ACNC: 4.47 UIU/ML (ref 0.36–3.74)

## 2025-07-22 DIAGNOSIS — E03.9 HYPOTHYROIDISM, UNSPECIFIED TYPE: Primary | ICD-10-CM

## 2025-07-22 RX ORDER — LEVOTHYROXINE SODIUM 75 UG/1
75 TABLET ORAL DAILY
Qty: 90 TABLET | Refills: 0 | Status: SHIPPED | OUTPATIENT
Start: 2025-07-22

## 2025-07-28 ENCOUNTER — PATIENT MESSAGE (OUTPATIENT)
Age: 45
End: 2025-07-28

## 2025-07-28 DIAGNOSIS — E03.9 HYPOTHYROIDISM, UNSPECIFIED TYPE: Primary | ICD-10-CM

## 2025-07-28 RX ORDER — LEVOTHYROXINE SODIUM 50 UG/1
50 TABLET ORAL DAILY
Qty: 90 TABLET | Refills: 0 | Status: SHIPPED | OUTPATIENT
Start: 2025-07-28

## (undated) DEVICE — Device

## (undated) DEVICE — BAG SPEC RETRV 275ML 10ML DISPOSABLE RELIACATCH

## (undated) DEVICE — SOLUTION IV 500ML 0.9% SOD CHL FLX CONT

## (undated) DEVICE — KENDALL SCD EXPRESS SLEEVES, KNEE LENGTH, MEDIUM: Brand: KENDALL SCD

## (undated) DEVICE — TROCAR SITE CLOSURE DEVICE: Brand: ENDO CLOSE

## (undated) DEVICE — 1200 GUARD II KIT W/5MM TUBE W/O VAC TUBE: Brand: GUARDIAN

## (undated) DEVICE — INFECTION CONTROL KIT SYS

## (undated) DEVICE — BLADELESS OPTICAL TROCAR WITH FIXATION CANNULA: Brand: VERSAPORT

## (undated) DEVICE — HANDLE LT SNAP ON ULT DURABLE LENS FOR TRUMPF ALC DISPOSABLE

## (undated) DEVICE — NEEDLE HYPO 22GA L1.5IN BLK S STL HUB POLYPR SHLD REG BVL

## (undated) DEVICE — DERMABOND SKIN ADH 0.7ML -- DERMABOND ADVANCED 12/BX

## (undated) DEVICE — DEVON™ KNEE AND BODY STRAP 60" X 3" (1.5 M X 7.6 CM): Brand: DEVON

## (undated) DEVICE — CLIP APPLIER WITH CLIP LOGIC TECHNOLOGY: Brand: ENDO CLIP III

## (undated) DEVICE — UNIVERSAL FIXATION CANNULA: Brand: VERSAONE

## (undated) DEVICE — SURGICAL PROCEDURE KIT GEN LAPAROSCOPY LF

## (undated) DEVICE — SYR 10ML LUER LOK 1/5ML GRAD --

## (undated) DEVICE — SUTURE SZ 0 27IN 5/8 CIR UR-6  TAPER PT VIOLET ABSRB VICRYL J603H

## (undated) DEVICE — STERILE POLYISOPRENE POWDER-FREE SURGICAL GLOVES: Brand: PROTEXIS

## (undated) DEVICE — SUTURE MCRYL SZ 4-0 L27IN ABSRB UD L19MM PS-2 1/2 CIR PRIM Y426H

## (undated) DEVICE — CATHETER URET 4FR L70CM POLYUR OLV FLX TIP KINK RESIST W/

## (undated) DEVICE — STERILE POLYISOPRENE POWDER-FREE SURGICAL GLOVES WITH EMOLLIENT COATING: Brand: PROTEXIS

## (undated) DEVICE — (D)PREP SKN CHLRAPRP APPL 26ML -- CONVERT TO ITEM 371833

## (undated) DEVICE — ELECTRODE ES 36CM LAP FLAT L HK COAT DISP CLEANCOAT

## (undated) DEVICE — BLADELESS OPTICAL TROCAR WITH FIXATION CANNULA: Brand: VERSAONE

## (undated) DEVICE — REM POLYHESIVE ADULT PATIENT RETURN ELECTRODE: Brand: VALLEYLAB

## (undated) DEVICE — DRAPE XR C ARM 41X74IN LF --